# Patient Record
Sex: FEMALE | Race: OTHER | NOT HISPANIC OR LATINO | Employment: STUDENT | URBAN - METROPOLITAN AREA
[De-identification: names, ages, dates, MRNs, and addresses within clinical notes are randomized per-mention and may not be internally consistent; named-entity substitution may affect disease eponyms.]

---

## 2023-08-26 ENCOUNTER — ATHLETIC TRAINING SESSION (OUTPATIENT)
Dept: SPORTS MEDICINE | Facility: CLINIC | Age: 23
End: 2023-08-26

## 2023-08-26 NOTE — PROGRESS NOTES
Subjective:       Chief Complaint: Jann Prakash is a 23 y.o. female student at [employer] who had concerns including Injury and Pain of the Right Femur (R Quad).    During pickup, Jann felt a pull in her R Quad. Her pain scale is 5/10 and walking is slightly uncomfortable.      Handedness: right-handed  Sport played: basketball      Level: college      Position:gaurd      Injury  This is a new problem. The current episode started yesterday. The problem occurs intermittently. The problem has been unchanged. The symptoms are aggravated by walking and bending. She has tried ice for the symptoms. The treatment provided mild relief.   Pain  This is a new problem. The current episode started yesterday. The problem occurs intermittently. The problem has been unchanged. The symptoms are aggravated by bending and walking. She has tried ice for the symptoms. The treatment provided mild relief.       ROS              Objective:       General: Jann is well-developed, well-nourished, appears stated age, in no acute distress, alert and oriented to time, place and person.         General Musculoskeletal Exam   Gait: normal       Right Knee Exam   Right knee exam is normal.    Tenderness   The patient is experiencing no tenderness.     Range of Motion   The patient has normal right knee ROM.    Tests   Ligament Examination   Lachman: normal (-1 to 2mm)   PCL-Posterior Drawer: normal (0 to 2mm)     MCL - Valgus: normal (0 to 2mm)  LCL - Varus: normal  Pivot Shift: normal (Equal)  Reverse Pivot Shift: normal (Equal)  Dial Test at 30 degrees: normal (< 5 degrees)  Dial Test at 90 degrees: normal (< 5 degrees)  Posterior Sag Test: negative  Posterolateral Corner: unstable (>15 degrees difference)    Other   Muscle Tightness: quadriceps tightness    Left Knee Exam   Left knee exam is normal.    Tenderness   The patient is experiencing no tenderness.     Range of Motion   The patient has normal left knee ROM.    Tests   Stability   Lachman:  normal (-1 to 2mm)   PCL-Posterior Drawer: normal (0 to 2mm)  MCL - Valgus: normal (0 to 2mm)  LCL - Varus: normal (0 to 2mm)  Pivot Shift: normal (Equal)  Reverse Pivot Shift: normal (Equal)  Dial Test at 30 degrees: normal (< 5 degrees)  Dial Test at 90 degrees: normal (< 5 degrees)  Posterior Sag Test: negative  Posterolateral Corner: unstable (>15 degrees difference)    Muscle Strength   Right Lower Extremity   Quadriceps:  4/5   Left Lower Extremity   Quadriceps:  5/5             Assessment:     Status: F - Full Participation    Date Out: N/A    Date Cleared: TBD      Plan:   Yasmeenviktoriya completed:    [x]  INJURY TREATMENT   []  MAINTENANCE  DATE OF SERVICE: 8/25/2023  INJURY/CONDITON: R Quad    Jann received the selected modalities after being cleared for contradictions.  Jann received education on potenital side effects of the selected modalities and agreed to treatment.      MODALITIES:    Cryotherapy / Thermotherapy Duration  (Mins) Add. Tx Parameters / Comment   []Cold Tub / Whirlpool (50-60 F)     []Contrast Bath (105-110 F & 50-65 F)     []Game Ready     []Hot Pack     []Hot Tub / Whirlpool ( F)     []Ice Massage     [x]Ice Pack 20mins W/stim   []Paraffin Wax (126-130 F)     []Vapocoolant Spray        Comment:       Electrotherapy Waveform   (AC/DC) Modulation (Cont./Interrupted/Surged) Intensity   (V) Pulse Width/Dur.  (uS) Pulse Rate/Freq.  (Hz, PPS or CPS) Duration  (Mins) Add. Tx Parameters / Comment   []Combo          [x]E-Stim - IFC Dc Juan Manuel. 19   20mins W/ ice   []E-Stim - Premod          []E-Stim - Haitian          []E-Stim - TENS          []E-Stim - Other          []Iontophoresis        Meds:     Comment:      Ultrasound Duty Cycle   (%) Freq.  (Mhz) Intensity   (w/cm2) Duration  (Mins) Add. Tx Parameters / Comment   []Combo        []Phonophoresis     Meds:   []Ultrasound         []Ultrasound and E-Stim          Comment:        Massage Duration  (Mins) Add. Tx Parameters / Comment   []Massage -  IASTM     []Massage - Scar Tissue     []Massage - Self Administered     []Massage - Therapeutic     []Myofascial Release        Comment:      Other Modalities Duration  (Mins)  Add. Tx Parameters / Comment   []Active Release     []Cupping     []Dry Needling     []Intermittent Compression      []Laser     []Lightwave     []Traction      []Other:       Comment:      THERAPEUTIC EXERCISES:    Stretching Cardio Rehab Other   []Stretching - Active []Cardio - Bike []Rehab - Ankle/Foot []Agility []PNF   []Stretching - Dynamic []Cardio - Elliptical []Rehab - Knee []Balance []ROM - Active   []Stretching - Passive []Cardio - Jog/Run []Rehab - Hip []Blood Flow Restriction []ROM - Passive   []Stretching - PNF []Cardio - Treadmill []Rehab - Wrist/Hand []Foam Roller []RTP - Concussion Protocol   []Stretching - Static []Cardio - Upper Body Ergometer []Rehab - Elbow []Functional Exercises []RTP - Sport Specific    []Cardio - Walk []Rehab - Shoulder []Joint Mobilization []Strengthening Exercises     []Rehab - Neck/Spine []Manual Therapy []Other:     []Rehab - Back []Plyometric Exercises      []Rehab - Other       Comment:            Warm-Up Reps/Sets/Time Weight #                         Exercise Reps/Sets/Time Weight #                                                       Comment:      Miscellaneous Add. Tx Parameters / Comment   []Compression Wrap    []Support Wrap    []Taping - Preventative    []Taping - Injured Part    []Wound Care    []Other:      Comment:         []  INJURY TREATMENT   [x]  MAINTENANCE  DATE OF SERVICE: 8/23/2023  INJURY/CONDITON: lower body    Yaly received the selected modalities after being cleared for contradictions.  Yaviktoriya received education on potenital side effects of the selected modalities and agreed to treatment.      MODALITIES:    Cryotherapy / Thermotherapy Duration  (Mins) Add. Tx Parameters / Comment   []Cold Tub / Whirlpool (50-60 F)     []Contrast Bath (105-110 F & 50-65 F)     []Game Ready      []Hot Pack     []Hot Tub / Whirlpool ( F)     []Ice Massage     []Ice Pack     []Paraffin Wax (126-130 F)     []Vapocoolant Spray        Comment:       Electrotherapy Waveform   (AC/DC) Modulation (Cont./Interrupted/Surged) Intensity   (V) Pulse Width/Dur.  (uS) Pulse Rate/Freq.  (Hz, PPS or CPS) Duration  (Mins) Add. Tx Parameters / Comment   []Combo          []E-Stim - IFC          []E-Stim - Premod          []E-Stim - Venezuelan          []E-Stim - TENS          []E-Stim - Other          []Iontophoresis        Meds:     Comment:      Ultrasound Duty Cycle   (%) Freq.  (Mhz) Intensity   (w/cm2) Duration  (Mins) Add. Tx Parameters / Comment   []Combo        []Phonophoresis     Meds:   []Ultrasound         []Ultrasound and E-Stim          Comment:        Massage Duration  (Mins) Add. Tx Parameters / Comment   []Massage - IASTM     []Massage - Scar Tissue     []Massage - Self Administered     []Massage - Therapeutic     []Myofascial Release        Comment:      Other Modalities Duration  (Mins)  Add. Tx Parameters / Comment   []Active Release     []Cupping     []Dry Needling     [x]Intermittent Compression  3omin Recovery boots   []Laser     []Lightwave     []Traction      []Other:       Comment:      THERAPEUTIC EXERCISES:    Stretching Cardio Rehab Other   []Stretching - Active []Cardio - Bike []Rehab - Ankle/Foot []Agility []PNF   []Stretching - Dynamic []Cardio - Elliptical []Rehab - Knee []Balance []ROM - Active   []Stretching - Passive []Cardio - Jog/Run []Rehab - Hip []Blood Flow Restriction []ROM - Passive   []Stretching - PNF []Cardio - Treadmill []Rehab - Wrist/Hand []Foam Roller []RTP - Concussion Protocol   []Stretching - Static []Cardio - Upper Body Ergometer []Rehab - Elbow []Functional Exercises []RTP - Sport Specific    []Cardio - Walk []Rehab - Shoulder []Joint Mobilization []Strengthening Exercises     []Rehab - Neck/Spine []Manual Therapy []Other:     []Rehab - Back []Plyometric Exercises       []Rehab - Other       Comment:            Warm-Up Reps/Sets/Time Weight #                         Exercise Reps/Sets/Time Weight #                                                       Comment:      Miscellaneous Add. Tx Parameters / Comment   []Compression Wrap    []Support Wrap    []Taping - Preventative    []Taping - Injured Part    []Wound Care    []Other:      Comment:         1. Started treatments and continue them as needed.  2. Physician Referral: no  3. ED Referral: no  4. Parent/Guardian Notified: No  5. All questions were answered, ath. will contact me for questions or concerns in  the interim.  6.         Eligible to use School Insurance: Yes

## 2023-08-29 NOTE — PROGRESS NOTES
Subjective:       Chief Complaint: Jann Prakash is a 23 y.o. female student at [employer] who had concerns including Injury and Pain of the Right Femur (R Quad).    Monday she came in and stated that the muscle is still tight. She had to be modified in weights with lungs because coming out of the lung was to painful.    During pickup, Jann felt a pull in her R Quad. Her pain scale is 5/10 and walking is slightly uncomfortable.         Handedness: right-handed  Sport played: basketball      Level: college      Position:gaurd      Injury  This is a recurrent problem. The current episode started in the past 7 days. The problem occurs every several days. The problem has been unchanged. The symptoms are aggravated by bending. She has tried ice for the symptoms. The treatment provided mild relief.   Pain  This is a recurrent problem. The current episode started in the past 7 days. The problem occurs daily. The problem has been unchanged. Nothing aggravates the symptoms. She has tried ice for the symptoms.       ROS              Objective:       General: Jann is well-developed, well-nourished, appears stated age, in no acute distress, alert and oriented to time, place and person.     AT Session          Assessment:     Status: AT - Cleared to Exert    Date Out: N/A    Date Cleared: TBD      Plan:   Jann completed:    [x]  INJURY TREATMENT   []  MAINTENANCE  DATE OF SERVICE: 8/30/2023  INJURY/CONDITON: R Quad    Jann received the selected modalities after being cleared for contradictions.  Jann received education on potenital side effects of the selected modalities and agreed to treatment.      MODALITIES:    Cryotherapy / Thermotherapy Duration  (Mins) Add. Tx Parameters / Comment   []Cold Tub / Whirlpool (50-60 F)     []Contrast Bath (105-110 F & 50-65 F)     []Game Ready     []Hot Pack     []Hot Tub / Whirlpool ( F)     []Ice Massage     []Ice Pack     []Paraffin Wax (126-130 F)     []Vapocoolant Spray         Comment:       Electrotherapy Waveform   (AC/DC) Modulation (Cont./Interrupted/Surged) Intensity   (V) Pulse Width/Dur.  (uS) Pulse Rate/Freq.  (Hz, PPS or CPS) Duration  (Mins) Add. Tx Parameters / Comment   []Combo          []E-Stim - IFC          []E-Stim - Premod          []E-Stim - Barbadian          []E-Stim - TENS          []E-Stim - Other          []Iontophoresis        Meds:     Comment:      Ultrasound Duty Cycle   (%) Freq.  (Mhz) Intensity   (w/cm2) Duration  (Mins) Add. Tx Parameters / Comment   []Combo        []Phonophoresis     Meds:   []Ultrasound         []Ultrasound and E-Stim          Comment:        Massage Duration  (Mins) Add. Tx Parameters / Comment   [x]Massage - IASTM 7mins    []Massage - Scar Tissue     []Massage - Self Administered     []Massage - Therapeutic     []Myofascial Release        Comment:      Other Modalities Duration  (Mins)  Add. Tx Parameters / Comment   []Active Release     []Cupping     []Dry Needling     []Intermittent Compression      []Laser     []Lightwave     []Traction      []Other:       Comment:      THERAPEUTIC EXERCISES:    Stretching Cardio Rehab Other   []Stretching - Active []Cardio - Bike []Rehab - Ankle/Foot []Agility []PNF   []Stretching - Dynamic []Cardio - Elliptical []Rehab - Knee []Balance []ROM - Active   []Stretching - Passive []Cardio - Jog/Run []Rehab - Hip []Blood Flow Restriction []ROM - Passive   []Stretching - PNF []Cardio - Treadmill []Rehab - Wrist/Hand []Foam Roller []RTP - Concussion Protocol   []Stretching - Static []Cardio - Upper Body Ergometer []Rehab - Elbow []Functional Exercises []RTP - Sport Specific    []Cardio - Walk []Rehab - Shoulder []Joint Mobilization []Strengthening Exercises     []Rehab - Neck/Spine []Manual Therapy []Other:     []Rehab - Back []Plyometric Exercises      []Rehab - Other       Comment:            Warm-Up Reps/Sets/Time Weight #                         Exercise Reps/Sets/Time Weight #                                                        Comment:      Miscellaneous Add. Tx Parameters / Comment   []Compression Wrap    []Support Wrap    []Taping - Preventative    []Taping - Injured Part    []Wound Care    []Other:      Comment:         [x]  INJURY TREATMENT   []  MAINTENANCE  DATE OF SERVICE: 8/28/2023  INJURY/CONDITON: MARGARITA Forte received the selected modalities after being cleared for contradictions.  Jann received education on potenital side effects of the selected modalities and agreed to treatment.      MODALITIES:    Cryotherapy / Thermotherapy Duration  (Mins) Add. Tx Parameters / Comment   []Cold Tub / Whirlpool (50-60 F)     []Contrast Bath (105-110 F & 50-65 F)     []Game Ready     []Hot Pack     []Hot Tub / Whirlpool ( F)     [x]Ice Massage 10mins    []Ice Pack     []Paraffin Wax (126-130 F)     []Vapocoolant Spray        Comment:       Electrotherapy Waveform   (AC/DC) Modulation (Cont./Interrupted/Surged) Intensity   (V) Pulse Width/Dur.  (uS) Pulse Rate/Freq.  (Hz, PPS or CPS) Duration  (Mins) Add. Tx Parameters / Comment   []Combo          []E-Stim - IFC          []E-Stim - Premod          []E-Stim - Omani          []E-Stim - TENS          []E-Stim - Other          []Iontophoresis        Meds:     Comment:      Ultrasound Duty Cycle   (%) Freq.  (Mhz) Intensity   (w/cm2) Duration  (Mins) Add. Tx Parameters / Comment   []Combo        []Phonophoresis     Meds:   []Ultrasound         []Ultrasound and E-Stim          Comment:        Massage Duration  (Mins) Add. Tx Parameters / Comment   []Massage - IASTM     []Massage - Scar Tissue     []Massage - Self Administered     []Massage - Therapeutic     []Myofascial Release        Comment:      Other Modalities Duration  (Mins)  Add. Tx Parameters / Comment   []Active Release     []Cupping     []Dry Needling     []Intermittent Compression      []Laser     []Lightwave     []Traction      []Other:       Comment:      THERAPEUTIC EXERCISES:    Stretching  Cardio Rehab Other   []Stretching - Active []Cardio - Bike []Rehab - Ankle/Foot []Agility []PNF   []Stretching - Dynamic []Cardio - Elliptical []Rehab - Knee []Balance []ROM - Active   []Stretching - Passive []Cardio - Jog/Run []Rehab - Hip []Blood Flow Restriction []ROM - Passive   []Stretching - PNF []Cardio - Treadmill []Rehab - Wrist/Hand []Foam Roller []RTP - Concussion Protocol   []Stretching - Static []Cardio - Upper Body Ergometer []Rehab - Elbow []Functional Exercises []RTP - Sport Specific    []Cardio - Walk []Rehab - Shoulder []Joint Mobilization []Strengthening Exercises     []Rehab - Neck/Spine []Manual Therapy []Other:     []Rehab - Back []Plyometric Exercises      []Rehab - Other       Comment:            Warm-Up Reps/Sets/Time Weight #                         Exercise Reps/Sets/Time Weight #                                                       Comment:      Miscellaneous Add. Tx Parameters / Comment   []Compression Wrap    []Support Wrap    []Taping - Preventative    []Taping - Injured Part    []Wound Care    []Other:      Comment:         1. Keep trying to losen the quad.  2. Physician Referral: no  3. ED Referral: no  4. Parent/Guardian Notified: No  5. All questions were answered, ath. will contact me for questions or concerns in  the interim.  6.         Eligible to use School Insurance: Yes

## 2023-09-09 NOTE — PROGRESS NOTES
Subjective:       Chief Complaint: Jann Prakash is a 23 y.o. female student at [employer] who had concerns including Injury and Pain of the Right Femur (R Quad).    Complain 9/8 that it was still feeling some tightness.    Monday she came in and stated that the muscle is still tight. She had to be modified in weights with lungs because coming out of the lung was to painful.     During pickup, Jann felt a pull in her R Quad. Her pain scale is 5/10 and walking is slightly uncomfortable.         Handedness: right-handed  Sport played: basketball      Level: college      Position:gaurd      Injury  This is a recurrent problem. The current episode started 1 to 4 weeks ago. The problem occurs every several days. The problem has been gradually improving. The symptoms are aggravated by bending. The treatment provided mild relief.   Pain  This is a recurrent problem. The current episode started 1 to 4 weeks ago. The problem occurs every several days. The problem has been gradually improving. The symptoms are aggravated by bending. The treatment provided mild relief.       ROS              Objective:       General: Jann is well-developed, well-nourished, appears stated age, in no acute distress, alert and oriented to time, place and person.               Right Knee Exam     Other   Muscle Tightness: quadriceps tightness    Left Knee Exam   Left knee exam is normal.            Assessment:     Status: F - Full Participation    Date Out: N/a    Date Cleared: N/a      Plan:   Jann completed:    [x]  INJURY TREATMENT   []  MAINTENANCE  DATE OF SERVICE: 9/8/2023  INJURY/CONDITON: R Quad    Jann received the selected modalities after being cleared for contradictions.  Jann received education on potenital side effects of the selected modalities and agreed to treatment.      MODALITIES:    Cryotherapy / Thermotherapy Duration  (Mins) Add. Tx Parameters / Comment   []Cold Tub / Whirlpool (50-60 F)     []Contrast Bath (105-110 F & 50-65 F)      []Game Ready     []Hot Pack     []Hot Tub / Whirlpool ( F)     []Ice Massage     []Ice Pack     []Paraffin Wax (126-130 F)     []Vapocoolant Spray        Comment:       Electrotherapy Waveform   (AC/DC) Modulation (Cont./Interrupted/Surged) Intensity   (V) Pulse Width/Dur.  (uS) Pulse Rate/Freq.  (Hz, PPS or CPS) Duration  (Mins) Add. Tx Parameters / Comment   []Combo          []E-Stim - IFC          []E-Stim - Premod          []E-Stim - Nauruan          []E-Stim - TENS          []E-Stim - Other          []Iontophoresis        Meds:     Comment:      Ultrasound Duty Cycle   (%) Freq.  (Mhz) Intensity   (w/cm2) Duration  (Mins) Add. Tx Parameters / Comment   []Combo        []Phonophoresis     Meds:   []Ultrasound         []Ultrasound and E-Stim          Comment:        Massage Duration  (Mins) Add. Tx Parameters / Comment   []Massage - IASTM     []Massage - Scar Tissue     []Massage - Self Administered     []Massage - Therapeutic     []Myofascial Release        Comment:      Other Modalities Duration  (Mins)  Add. Tx Parameters / Comment   []Active Release     [x]Cupping 8mins Active movement for 2mins   []Dry Needling     []Intermittent Compression      []Laser     []Lightwave     []Traction      []Other:       Comment:      THERAPEUTIC EXERCISES:    Stretching Cardio Rehab Other   []Stretching - Active []Cardio - Bike []Rehab - Ankle/Foot []Agility []PNF   []Stretching - Dynamic []Cardio - Elliptical []Rehab - Knee []Balance []ROM - Active   []Stretching - Passive []Cardio - Jog/Run []Rehab - Hip []Blood Flow Restriction []ROM - Passive   []Stretching - PNF []Cardio - Treadmill []Rehab - Wrist/Hand []Foam Roller []RTP - Concussion Protocol   []Stretching - Static []Cardio - Upper Body Ergometer []Rehab - Elbow []Functional Exercises []RTP - Sport Specific    []Cardio - Walk []Rehab - Shoulder []Joint Mobilization []Strengthening Exercises     []Rehab - Neck/Spine []Manual Therapy []Other:     []Rehab -  Back []Plyometric Exercises      []Rehab - Other       Comment:            Warm-Up Reps/Sets/Time Weight #                         Exercise Reps/Sets/Time Weight #                                                       Comment:      Miscellaneous Add. Tx Parameters / Comment   []Compression Wrap    []Support Wrap    []Taping - Preventative    []Taping - Injured Part    []Wound Care    []Other:      Comment:         1. Treatment as needed  2. Physician Referral: no  3. ED Referral: no  4. Parent/Guardian Notified: No  5. All questions were answered, ath. will contact me for questions or concerns in  the interim.  6.         Eligible to use School Insurance: Yes

## 2023-09-26 ENCOUNTER — OFFICE VISIT (OUTPATIENT)
Dept: URGENT CARE | Facility: CLINIC | Age: 23
End: 2023-09-26
Payer: COMMERCIAL

## 2023-09-26 VITALS
HEIGHT: 68 IN | OXYGEN SATURATION: 97 % | RESPIRATION RATE: 18 BRPM | BODY MASS INDEX: 24 KG/M2 | SYSTOLIC BLOOD PRESSURE: 115 MMHG | TEMPERATURE: 98 F | DIASTOLIC BLOOD PRESSURE: 71 MMHG | HEART RATE: 86 BPM | WEIGHT: 158.38 LBS

## 2023-09-26 DIAGNOSIS — H66.001 NON-RECURRENT ACUTE SUPPURATIVE OTITIS MEDIA OF RIGHT EAR WITHOUT SPONTANEOUS RUPTURE OF TYMPANIC MEMBRANE: ICD-10-CM

## 2023-09-26 DIAGNOSIS — J02.9 SORE THROAT: Primary | ICD-10-CM

## 2023-09-26 DIAGNOSIS — J32.9 SINUSITIS, UNSPECIFIED CHRONICITY, UNSPECIFIED LOCATION: ICD-10-CM

## 2023-09-26 LAB
CTP QC/QA: YES
CTP QC/QA: YES
MOLECULAR STREP A: NEGATIVE
SARS-COV-2 AG RESP QL IA.RAPID: NEGATIVE

## 2023-09-26 PROCEDURE — 87811 SARS-COV-2 COVID19 W/OPTIC: CPT | Mod: QW,S$GLB,, | Performed by: INTERNAL MEDICINE

## 2023-09-26 PROCEDURE — 99499 NO LOS: ICD-10-PCS | Mod: S$GLB,,, | Performed by: INTERNAL MEDICINE

## 2023-09-26 PROCEDURE — 87811 SARS CORONAVIRUS 2 ANTIGEN POCT, MANUAL READ: ICD-10-PCS | Mod: QW,S$GLB,, | Performed by: INTERNAL MEDICINE

## 2023-09-26 PROCEDURE — 99499 UNLISTED E&M SERVICE: CPT | Mod: S$GLB,,, | Performed by: INTERNAL MEDICINE

## 2023-09-26 PROCEDURE — 87651 POCT STREP A MOLECULAR: ICD-10-PCS | Mod: QW,S$GLB,, | Performed by: INTERNAL MEDICINE

## 2023-09-26 PROCEDURE — 87651 STREP A DNA AMP PROBE: CPT | Mod: QW,S$GLB,, | Performed by: INTERNAL MEDICINE

## 2023-09-26 RX ORDER — PREDNISONE 20 MG/1
20 TABLET ORAL DAILY
Qty: 3 TABLET | Refills: 0 | Status: SHIPPED | OUTPATIENT
Start: 2023-09-26 | End: 2023-09-29

## 2023-09-26 RX ORDER — AMOXICILLIN 500 MG/1
500 TABLET, FILM COATED ORAL EVERY 12 HOURS
Qty: 14 TABLET | Refills: 0 | Status: SHIPPED | OUTPATIENT
Start: 2023-09-26 | End: 2023-10-03

## 2023-09-26 NOTE — PROGRESS NOTES
"Subjective:      Patient ID: Jann Prakash is a 23 y.o. female.    Vitals:  height is 5' 8" (1.727 m) and weight is 71.8 kg (158 lb 6.4 oz). Her temperature is 98.3 °F (36.8 °C). Her blood pressure is 115/71 and her pulse is 86. Her respiration is 18 and oxygen saturation is 97%.     Chief Complaint: Sinus Problem    Student of [employer].    Sinus Problem  This is a new problem. The current episode started today. The problem has been gradually worsening since onset. There has been no fever. Her pain is at a severity of 7/10. The pain is moderate. Associated symptoms include chills, congestion, ear pain, headaches, a hoarse voice, neck pain, sinus pressure, a sore throat and swollen glands. Pertinent negatives include no coughing, diaphoresis, shortness of breath or sneezing. Past treatments include nothing. The treatment provided no relief.       Constitution: Positive for chills. Negative for sweating.   HENT:  Positive for ear pain, congestion, sinus pressure and sore throat.    Neck: Positive for neck pain.   Respiratory:  Negative for cough and shortness of breath.    Skin:  Negative for erythema.   Allergic/Immunologic: Negative for sneezing.   Neurological:  Positive for headaches.      Objective:     Physical Exam   Constitutional: She is oriented to person, place, and time. She appears well-developed. No distress.   HENT:   Head: Normocephalic and atraumatic.   Ears:   Right Ear: External ear normal.   Left Ear: Tympanic membrane, external ear and ear canal normal.      Comments: Right TM bulginging and red.   Nose: Congestion present.   Mouth/Throat: Oropharynx is clear and moist. Mucous membranes are moist. No oropharyngeal exudate. Oropharynx is clear.   Eyes: Conjunctivae and EOM are normal. Pupils are equal, round, and reactive to light. Right eye exhibits no discharge. Left eye exhibits no discharge. No scleral icterus.   Neck: Neck supple.   Cardiovascular: Normal rate, regular rhythm and normal heart " sounds.   No murmur heard.Exam reveals no gallop and no friction rub.   Pulmonary/Chest: Effort normal. No respiratory distress. She has no wheezes. She has no rales.   Abdominal: There is no abdominal tenderness.   Lymphadenopathy:     She has no cervical adenopathy.   Neurological: She is alert and oriented to person, place, and time.   Skin: Skin is warm, dry, not diaphoretic and no rash. Capillary refill takes less than 2 seconds. No erythema   Psychiatric: Her behavior is normal.   Nursing note and vitals reviewed.      Assessment:     1. Sore throat    2. Non-recurrent acute suppurative otitis media of right ear without spontaneous rupture of tympanic membrane        Plan:       Sore throat  -     SARS Coronavirus 2 Antigen, POCT Manual Read  -     POCT Strep A, Molecular    Non-recurrent acute suppurative otitis media of right ear without spontaneous rupture of tympanic membrane  -     amoxicillin (AMOXIL) 500 MG Tab; Take 1 tablet (500 mg total) by mouth every 12 (twelve) hours. for 7 days  Dispense: 14 tablet; Refill: 0  -     predniSONE (DELTASONE) 20 MG tablet; Take 1 tablet (20 mg total) by mouth once daily. for 3 days  Dispense: 3 tablet; Refill: 0

## 2023-09-30 ENCOUNTER — ATHLETIC TRAINING SESSION (OUTPATIENT)
Dept: SPORTS MEDICINE | Facility: CLINIC | Age: 23
End: 2023-09-30
Payer: COMMERCIAL

## 2023-09-30 NOTE — PROGRESS NOTES
Subjective:       Chief Complaint: Jann Prakash is a 23 y.o. female student at [employer] who had no chief complaint listed for this encounter.    On 9/25/2023, Jann reports to me complaining of pain and not feeling good. She goes to the  and his negative for strep throat and Covid. She was diagnose with and ear infection. I gave her some meds for motion sickness and antibiotics which has been helping with the ear infection. But on Friday I get an text message at  3:51am. Stating that she was not feeling good and she is feeling worse then before. I immediately told her to stay home from practice.    Handedness: right-handed  Sport played: basketball      Level: college      Position:geri MARTÍNEZ              Objective:       General: Jann is well-developed, well-nourished, appears stated age, in no acute distress, alert and oriented to time, place and person.     AT Session          Assessment:     Status: F - Full Participation    Date Out: 9/29/2023    Date Cleared: 8/1/2023      Plan:   Jann completed:      []  INJURY TREATMENT   [x]  MAINTENANCE  DATE OF SERVICE: 9/28/2023  INJURY/CONDITON: Both ankles, Both qauds    Jann received the selected modalities after being cleared for contradictions.  Jann received education on potenital side effects of the selected modalities and agreed to treatment.      MODALITIES:    Cryotherapy / Thermotherapy Duration  (Mins) Add. Tx Parameters / Comment   []Cold Tub / Whirlpool (50-60 F)     []Contrast Bath (105-110 F & 50-65 F)     []Game Ready     []Hot Pack     []Hot Tub / Whirlpool ( F)     []Ice Massage     []Ice Pack     []Paraffin Wax (126-130 F)     []Vapocoolant Spray        Comment:       Electrotherapy Waveform   (AC/DC) Modulation (Cont./Interrupted/Surged) Intensity   (V) Pulse Width/Dur.  (uS) Pulse Rate/Freq.  (Hz, PPS or CPS) Duration  (Mins) Add. Tx Parameters / Comment   []Combo          []E-Stim - IFC          []E-Stim - Premod          []E-Stim -  Greenlandic          []E-Stim - TENS          []E-Stim - Other          []Iontophoresis        Meds:     Comment:      Ultrasound Duty Cycle   (%) Freq.  (Mhz) Intensity   (w/cm2) Duration  (Mins) Add. Tx Parameters / Comment   []Combo        []Phonophoresis     Meds:   []Ultrasound         []Ultrasound and E-Stim          Comment:        Massage Duration  (Mins) Add. Tx Parameters / Comment   []Massage - IASTM     []Massage - Scar Tissue     []Massage - Self Administered     []Massage - Therapeutic     []Myofascial Release        Comment:      Other Modalities Duration  (Mins)  Add. Tx Parameters / Comment   []Active Release     [x]Cupping 7mins    []Dry Needling     []Intermittent Compression      []Laser     []Lightwave     []Traction      []Other:       Comment:      THERAPEUTIC EXERCISES:    Stretching Cardio Rehab Other   []Stretching - Active []Cardio - Bike []Rehab - Ankle/Foot []Agility []PNF   []Stretching - Dynamic []Cardio - Elliptical []Rehab - Knee []Balance []ROM - Active   []Stretching - Passive []Cardio - Jog/Run []Rehab - Hip []Blood Flow Restriction []ROM - Passive   []Stretching - PNF []Cardio - Treadmill []Rehab - Wrist/Hand []Foam Roller []RTP - Concussion Protocol   []Stretching - Static []Cardio - Upper Body Ergometer []Rehab - Elbow []Functional Exercises []RTP - Sport Specific    []Cardio - Walk []Rehab - Shoulder []Joint Mobilization []Strengthening Exercises     []Rehab - Neck/Spine []Manual Therapy []Other:     []Rehab - Back []Plyometric Exercises      []Rehab - Other       Comment:            Warm-Up Reps/Sets/Time Weight #                         Exercise Reps/Sets/Time Weight #                                                       Comment:      Miscellaneous Add. Tx Parameters / Comment   []Compression Wrap    []Support Wrap    [x]Taping - Preventative ankles   []Taping - Injured Part    []Wound Care    []Other:      Comment:         []  INJURY TREATMENT   [x]  MAINTENANCE  DATE OF  SERVICE: 9/272/2023  INJURY/CONDITON: Both ankles    Jann received the selected modalities after being cleared for contradictions.  Jann received education on potenital side effects of the selected modalities and agreed to treatment.      MODALITIES:    Cryotherapy / Thermotherapy Duration  (Mins) Add. Tx Parameters / Comment   []Cold Tub / Whirlpool (50-60 F)     []Contrast Bath (105-110 F & 50-65 F)     []Game Ready     []Hot Pack     []Hot Tub / Whirlpool ( F)     []Ice Massage     []Ice Pack     []Paraffin Wax (126-130 F)     []Vapocoolant Spray        Comment:       Electrotherapy Waveform   (AC/DC) Modulation (Cont./Interrupted/Surged) Intensity   (V) Pulse Width/Dur.  (uS) Pulse Rate/Freq.  (Hz, PPS or CPS) Duration  (Mins) Add. Tx Parameters / Comment   []Combo          []E-Stim - IFC          []E-Stim - Premod          []E-Stim - Gambian          []E-Stim - TENS          []E-Stim - Other          []Iontophoresis        Meds:     Comment:      Ultrasound Duty Cycle   (%) Freq.  (Mhz) Intensity   (w/cm2) Duration  (Mins) Add. Tx Parameters / Comment   []Combo        []Phonophoresis     Meds:   []Ultrasound         []Ultrasound and E-Stim          Comment:        Massage Duration  (Mins) Add. Tx Parameters / Comment   []Massage - IASTM     []Massage - Scar Tissue     []Massage - Self Administered     []Massage - Therapeutic     []Myofascial Release        Comment:      Other Modalities Duration  (Mins)  Add. Tx Parameters / Comment   []Active Release     []Cupping     []Dry Needling     []Intermittent Compression      []Laser     []Lightwave     []Traction      []Other:       Comment:      THERAPEUTIC EXERCISES:    Stretching Cardio Rehab Other   []Stretching - Active []Cardio - Bike []Rehab - Ankle/Foot []Agility []PNF   []Stretching - Dynamic []Cardio - Elliptical []Rehab - Knee []Balance []ROM - Active   []Stretching - Passive []Cardio - Jog/Run []Rehab - Hip []Blood Flow Restriction []ROM - Passive    []Stretching - PNF []Cardio - Treadmill []Rehab - Wrist/Hand []Foam Roller []RTP - Concussion Protocol   []Stretching - Static []Cardio - Upper Body Ergometer []Rehab - Elbow []Functional Exercises []RTP - Sport Specific    []Cardio - Walk []Rehab - Shoulder []Joint Mobilization []Strengthening Exercises     []Rehab - Neck/Spine []Manual Therapy []Other:     []Rehab - Back []Plyometric Exercises      []Rehab - Other       Comment:            Warm-Up Reps/Sets/Time Weight #                         Exercise Reps/Sets/Time Weight #                                                       Comment:      Miscellaneous Add. Tx Parameters / Comment   []Compression Wrap    []Support Wrap    [x]Taping - Preventative ankles   []Taping - Injured Part    []Wound Care    []Other:      Comment:         []  INJURY TREATMENT   [x]  MAINTENANCE  DATE OF SERVICE: 9/26/2023  INJURY/CONDITON: Both ankles    Jann received the selected modalities after being cleared for contradictions.  Jann received education on potenital side effects of the selected modalities and agreed to treatment.      MODALITIES:    Cryotherapy / Thermotherapy Duration  (Mins) Add. Tx Parameters / Comment   []Cold Tub / Whirlpool (50-60 F)     []Contrast Bath (105-110 F & 50-65 F)     []Game Ready     []Hot Pack     []Hot Tub / Whirlpool ( F)     []Ice Massage     []Ice Pack     []Paraffin Wax (126-130 F)     []Vapocoolant Spray        Comment:       Electrotherapy Waveform   (AC/DC) Modulation (Cont./Interrupted/Surged) Intensity   (V) Pulse Width/Dur.  (uS) Pulse Rate/Freq.  (Hz, PPS or CPS) Duration  (Mins) Add. Tx Parameters / Comment   []Combo          []E-Stim - IFC          []E-Stim - Premod          []E-Stim - Macanese          []E-Stim - TENS          []E-Stim - Other          []Iontophoresis        Meds:     Comment:      Ultrasound Duty Cycle   (%) Freq.  (Mhz) Intensity   (w/cm2) Duration  (Mins) Add. Tx Parameters / Comment   []Combo         []Phonophoresis     Meds:   []Ultrasound         []Ultrasound and E-Stim          Comment:        Massage Duration  (Mins) Add. Tx Parameters / Comment   []Massage - IASTM     []Massage - Scar Tissue     []Massage - Self Administered     []Massage - Therapeutic     []Myofascial Release        Comment:      Other Modalities Duration  (Mins)  Add. Tx Parameters / Comment   []Active Release     []Cupping     []Dry Needling     []Intermittent Compression      []Laser     []Lightwave     []Traction      []Other:       Comment:      THERAPEUTIC EXERCISES:    Stretching Cardio Rehab Other   []Stretching - Active []Cardio - Bike []Rehab - Ankle/Foot []Agility []PNF   []Stretching - Dynamic []Cardio - Elliptical []Rehab - Knee []Balance []ROM - Active   []Stretching - Passive []Cardio - Jog/Run []Rehab - Hip []Blood Flow Restriction []ROM - Passive   []Stretching - PNF []Cardio - Treadmill []Rehab - Wrist/Hand []Foam Roller []RTP - Concussion Protocol   []Stretching - Static []Cardio - Upper Body Ergometer []Rehab - Elbow []Functional Exercises []RTP - Sport Specific    []Cardio - Walk []Rehab - Shoulder []Joint Mobilization []Strengthening Exercises     []Rehab - Neck/Spine []Manual Therapy []Other:     []Rehab - Back []Plyometric Exercises      []Rehab - Other       Comment:            Warm-Up Reps/Sets/Time Weight #                         Exercise Reps/Sets/Time Weight #                                                       Comment:      Miscellaneous Add. Tx Parameters / Comment   []Compression Wrap    []Support Wrap    [x]Taping - Preventative ankles   []Taping - Injured Part    []Wound Care    []Other:      Comment:         []  INJURY TREATMENT   [x]  MAINTENANCE  DATE OF SERVICE: 9/25/2023  INJURY/CONDITON:Both ankles    Jann received the selected modalities after being cleared for contradictions.  Jann received education on potenital side effects of the selected modalities and agreed to  treatment.      MODALITIES:    Cryotherapy / Thermotherapy Duration  (Mins) Add. Tx Parameters / Comment   []Cold Tub / Whirlpool (50-60 F)     []Contrast Bath (105-110 F & 50-65 F)     []Game Ready     []Hot Pack     []Hot Tub / Whirlpool ( F)     []Ice Massage     []Ice Pack     []Paraffin Wax (126-130 F)     []Vapocoolant Spray        Comment:       Electrotherapy Waveform   (AC/DC) Modulation (Cont./Interrupted/Surged) Intensity   (V) Pulse Width/Dur.  (uS) Pulse Rate/Freq.  (Hz, PPS or CPS) Duration  (Mins) Add. Tx Parameters / Comment   []Combo          []E-Stim - IFC          []E-Stim - Premod          []E-Stim - Omani          []E-Stim - TENS          []E-Stim - Other          []Iontophoresis        Meds:     Comment:      Ultrasound Duty Cycle   (%) Freq.  (Mhz) Intensity   (w/cm2) Duration  (Mins) Add. Tx Parameters / Comment   []Combo        []Phonophoresis     Meds:   []Ultrasound         []Ultrasound and E-Stim          Comment:        Massage Duration  (Mins) Add. Tx Parameters / Comment   []Massage - IASTM     []Massage - Scar Tissue     []Massage - Self Administered     []Massage - Therapeutic     []Myofascial Release        Comment:      Other Modalities Duration  (Mins)  Add. Tx Parameters / Comment   []Active Release     []Cupping     []Dry Needling     []Intermittent Compression      []Laser     []Lightwave     []Traction      []Other:       Comment:      THERAPEUTIC EXERCISES:    Stretching Cardio Rehab Other   []Stretching - Active []Cardio - Bike []Rehab - Ankle/Foot []Agility []PNF   []Stretching - Dynamic []Cardio - Elliptical []Rehab - Knee []Balance []ROM - Active   []Stretching - Passive []Cardio - Jog/Run []Rehab - Hip []Blood Flow Restriction []ROM - Passive   []Stretching - PNF []Cardio - Treadmill []Rehab - Wrist/Hand []Foam Roller []RTP - Concussion Protocol   []Stretching - Static []Cardio - Upper Body Ergometer []Rehab - Elbow []Functional Exercises []RTP - Sport Specific     []Cardio - Walk []Rehab - Shoulder []Joint Mobilization []Strengthening Exercises     []Rehab - Neck/Spine []Manual Therapy []Other:     []Rehab - Back []Plyometric Exercises      []Rehab - Other       Comment:            Warm-Up Reps/Sets/Time Weight #                         Exercise Reps/Sets/Time Weight #                                                       Comment:      Miscellaneous Add. Tx Parameters / Comment   []Compression Wrap    []Support Wrap    [x]Taping - Preventative ankles   []Taping - Injured Part    []Wound Care    []Other:      Comment:         1. Rest and she her back on Sunday  2. Physician Referral: no  3. ED Referral: no  4. Parent/Guardian Notified: No  5. All questions were answered, ath. will contact me for questions or concerns in  the interim.  6.         Eligible to use School Insurance: No, not a school related injury

## 2023-10-03 ENCOUNTER — OFFICE VISIT (OUTPATIENT)
Dept: URGENT CARE | Facility: CLINIC | Age: 23
End: 2023-10-03
Payer: COMMERCIAL

## 2023-10-03 VITALS
OXYGEN SATURATION: 100 % | WEIGHT: 158 LBS | BODY MASS INDEX: 23.95 KG/M2 | HEART RATE: 80 BPM | HEIGHT: 68 IN | SYSTOLIC BLOOD PRESSURE: 126 MMHG | TEMPERATURE: 98 F | RESPIRATION RATE: 18 BRPM | DIASTOLIC BLOOD PRESSURE: 75 MMHG

## 2023-10-03 DIAGNOSIS — R50.9 FEVER, UNSPECIFIED FEVER CAUSE: ICD-10-CM

## 2023-10-03 DIAGNOSIS — J02.9 SORE THROAT: ICD-10-CM

## 2023-10-03 DIAGNOSIS — R53.83 FATIGUE, UNSPECIFIED TYPE: ICD-10-CM

## 2023-10-03 DIAGNOSIS — R05.9 COUGH, UNSPECIFIED TYPE: Primary | ICD-10-CM

## 2023-10-03 LAB
ALBUMIN SERPL BCP-MCNC: 4.2 G/DL (ref 3.5–5.2)
ALP SERPL-CCNC: 61 U/L (ref 55–135)
ALT SERPL W/O P-5'-P-CCNC: 14 U/L (ref 10–44)
ANION GAP SERPL CALC-SCNC: 10 MMOL/L (ref 8–16)
AST SERPL-CCNC: 16 U/L (ref 10–40)
BASOPHILS # BLD AUTO: 0.06 K/UL (ref 0–0.2)
BASOPHILS NFR BLD: 0.8 % (ref 0–1.9)
BILIRUB SERPL-MCNC: 0.3 MG/DL (ref 0.1–1)
BUN SERPL-MCNC: 13 MG/DL (ref 6–20)
CALCIUM SERPL-MCNC: 9.9 MG/DL (ref 8.7–10.5)
CHLORIDE SERPL-SCNC: 101 MMOL/L (ref 95–110)
CO2 SERPL-SCNC: 27 MMOL/L (ref 23–29)
CREAT SERPL-MCNC: 0.8 MG/DL (ref 0.5–1.4)
CTP QC/QA: YES
CTP QC/QA: YES
DIFFERENTIAL METHOD: ABNORMAL
EOSINOPHIL # BLD AUTO: 0.1 K/UL (ref 0–0.5)
EOSINOPHIL NFR BLD: 1.6 % (ref 0–8)
ERYTHROCYTE [DISTWIDTH] IN BLOOD BY AUTOMATED COUNT: 12.8 % (ref 11.5–14.5)
EST. GFR  (NO RACE VARIABLE): >60 ML/MIN/1.73 M^2
GLUCOSE SERPL-MCNC: 116 MG/DL (ref 70–110)
HCT VFR BLD AUTO: 39.6 % (ref 37–48.5)
HETEROPH AB SERPL QL IA: NEGATIVE
HGB BLD-MCNC: 13.7 G/DL (ref 12–16)
HIV 1+2 AB+HIV1 P24 AG SERPL QL IA: NORMAL
IMM GRANULOCYTES # BLD AUTO: 0.1 K/UL (ref 0–0.04)
IMM GRANULOCYTES NFR BLD AUTO: 1.3 % (ref 0–0.5)
LYMPHOCYTES # BLD AUTO: 2.2 K/UL (ref 1–4.8)
LYMPHOCYTES NFR BLD: 27.8 % (ref 18–48)
MCH RBC QN AUTO: 29.4 PG (ref 27–31)
MCHC RBC AUTO-ENTMCNC: 34.6 G/DL (ref 32–36)
MCV RBC AUTO: 85 FL (ref 82–98)
MOLECULAR STREP A: NEGATIVE
MONOCYTES # BLD AUTO: 0.5 K/UL (ref 0.3–1)
MONOCYTES NFR BLD: 6.1 % (ref 4–15)
NEUTROPHILS # BLD AUTO: 4.9 K/UL (ref 1.8–7.7)
NEUTROPHILS NFR BLD: 62.4 % (ref 38–73)
NRBC BLD-RTO: 0 /100 WBC
PLATELET # BLD AUTO: 375 K/UL (ref 150–450)
PMV BLD AUTO: 9.3 FL (ref 9.2–12.9)
POTASSIUM SERPL-SCNC: 4 MMOL/L (ref 3.5–5.1)
PROT SERPL-MCNC: 8.2 G/DL (ref 6–8.4)
RBC # BLD AUTO: 4.66 M/UL (ref 4–5.4)
SARS-COV-2 AG RESP QL IA.RAPID: NEGATIVE
SODIUM SERPL-SCNC: 138 MMOL/L (ref 136–145)
WBC # BLD AUTO: 7.9 K/UL (ref 3.9–12.7)

## 2023-10-03 PROCEDURE — 87811 SARS-COV-2 COVID19 W/OPTIC: CPT | Mod: QW,S$GLB,, | Performed by: INTERNAL MEDICINE

## 2023-10-03 PROCEDURE — 87651 POCT STREP A MOLECULAR: ICD-10-PCS | Mod: QW,S$GLB,, | Performed by: INTERNAL MEDICINE

## 2023-10-03 PROCEDURE — 87651 STREP A DNA AMP PROBE: CPT | Mod: QW,S$GLB,, | Performed by: INTERNAL MEDICINE

## 2023-10-03 PROCEDURE — 85025 COMPLETE CBC W/AUTO DIFF WBC: CPT | Performed by: INTERNAL MEDICINE

## 2023-10-03 PROCEDURE — 86665 EPSTEIN-BARR CAPSID VCA: CPT | Mod: 59 | Performed by: INTERNAL MEDICINE

## 2023-10-03 PROCEDURE — 87389 HIV-1 AG W/HIV-1&-2 AB AG IA: CPT | Performed by: INTERNAL MEDICINE

## 2023-10-03 PROCEDURE — 99499 UNLISTED E&M SERVICE: CPT | Mod: S$GLB,,, | Performed by: INTERNAL MEDICINE

## 2023-10-03 PROCEDURE — 99499 NO LOS: ICD-10-PCS | Mod: S$GLB,,, | Performed by: INTERNAL MEDICINE

## 2023-10-03 PROCEDURE — 86645 CMV ANTIBODY IGM: CPT | Performed by: INTERNAL MEDICINE

## 2023-10-03 PROCEDURE — 87811 SARS CORONAVIRUS 2 ANTIGEN POCT, MANUAL READ: ICD-10-PCS | Mod: QW,S$GLB,, | Performed by: INTERNAL MEDICINE

## 2023-10-03 PROCEDURE — 80053 COMPREHEN METABOLIC PANEL: CPT | Performed by: INTERNAL MEDICINE

## 2023-10-03 PROCEDURE — 86308 HETEROPHILE ANTIBODY SCREEN: CPT | Performed by: INTERNAL MEDICINE

## 2023-10-03 NOTE — PROGRESS NOTES
"Subjective:      Patient ID: Jann Prakash is a 23 y.o. female.    Vitals:  height is 5' 8" (1.727 m) and weight is 71.7 kg (158 lb). Her temperature is 98.1 °F (36.7 °C). Her blood pressure is 126/75 and her pulse is 80. Her respiration is 18 and oxygen saturation is 100%.     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 Pt reports ears are better.Pt reports she is also having a allergic reaction to something she is having a rash on shoulders and face. Pt reports the rash started 2 days ago and its better today.     Sinus Problem  This is a recurrent problem. The current episode started 1 to 4 weeks ago. The problem has been gradually improving since onset. There has been no fever. Her pain is at a severity of 3/10. The pain is mild. Associated symptoms include chills, congestion, coughing, headaches, a hoarse voice, sinus pressure, sneezing, a sore throat and swollen glands. Pertinent negatives include no diaphoresis, ear pain, neck pain or shortness of breath. (Fatigue  ) Treatments tried: anitibiotics , allergy relief (can not remeber the name of medicine,  gave it to her) The treatment provided mild relief.       Constitution: Positive for chills. Negative for sweating.   HENT:  Positive for congestion, sinus pressure and sore throat. Negative for ear pain.    Neck: Negative for neck pain.   Respiratory:  Positive for cough. Negative for shortness of breath.    Skin:  Negative for erythema.   Allergic/Immunologic: Positive for sneezing.   Neurological:  Positive for headaches.      Objective:     Physical Exam   Constitutional: She is oriented to person, place, and time. She appears well-developed.  Non-toxic appearance. She does not appear ill. No distress.   HENT:   Head: Normocephalic and atraumatic.   Ears:   Right Ear: External ear normal.   Left Ear: External ear normal.   Nose: Nose normal.   Mouth/Throat: Oropharynx is clear and moist. Mucous membranes are moist. No oropharyngeal exudate. " Oropharynx is clear.   Eyes: Conjunctivae and EOM are normal. Pupils are equal, round, and reactive to light. Right eye exhibits no discharge. Left eye exhibits no discharge. No scleral icterus.   Neck: Neck supple.   Cardiovascular: Normal rate, regular rhythm and normal heart sounds.   No murmur heard.Exam reveals no gallop and no friction rub.   Pulmonary/Chest: Effort normal. No respiratory distress. She has no wheezes. She has no rales.   Abdominal: There is no abdominal tenderness.   Lymphadenopathy:     She has cervical adenopathy.   Neurological: She is alert and oriented to person, place, and time.   Skin: Skin is warm, dry, not diaphoretic and no rash. Capillary refill takes less than 2 seconds. No erythema   Psychiatric: Her behavior is normal.   Nursing note and vitals reviewed.      Assessment:     1. Cough, unspecified type    2. Sore throat    3. Fever, unspecified fever cause    4. Fatigue, unspecified type        Plan:       Cough, unspecified type  -     SARS Coronavirus 2 Antigen, POCT Manual Read    Sore throat  -     HETEROPHILE AB SCREEN  -     POCT Strep A, Molecular  -     CBC W/ AUTO DIFFERENTIAL  -     COMPREHENSIVE METABOLIC PANEL  -     HIV 1/2 Ag/Ab (4th Gen)  -     V[place]-BARR VIRUS ANTIBODY PANEL  -     CYTOMEGALOVIRUS (CMV) AB, IGM  -     TSH  -     (Magic mouthwash) 1:1:1 diphenhydrAMINE(Benadryl) 12.5mg/5ml liq, aluminum & magnesium hydroxide-simethicone (Maalox), LIDOcaine viscous 2%; Swish and spit 10 mLs every 4 (four) hours as needed. for mouth sores  Dispense: 360 mL; Refill: 0    Fever, unspecified fever cause  -     HETEROPHILE AB SCREEN  -     POCT Strep A, Molecular  -     CBC W/ AUTO DIFFERENTIAL  -     COMPREHENSIVE METABOLIC PANEL  -     HIV 1/2 Ag/Ab (4th Gen)  -     V[place]-BARR VIRUS ANTIBODY PANEL  -     CYTOMEGALOVIRUS (CMV) AB, IGM  -     TSH  -     (Magic mouthwash) 1:1:1 diphenhydrAMINE(Benadryl) 12.5mg/5ml liq, aluminum & magnesium hydroxide-simethicone  (Maalox), LIDOcaine viscous 2%; Swish and spit 10 mLs every 4 (four) hours as needed. for mouth sores  Dispense: 360 mL; Refill: 0    Fatigue, unspecified type  -     HETEROPHILE AB SCREEN  -     POCT Strep A, Molecular  -     CBC W/ AUTO DIFFERENTIAL  -     COMPREHENSIVE METABOLIC PANEL  -     HIV 1/2 Ag/Ab (4th Gen)  -     VIRGINIA-BARR VIRUS ANTIBODY PANEL  -     CYTOMEGALOVIRUS (CMV) AB, IGM  -     TSH  -     (Magic mouthwash) 1:1:1 diphenhydrAMINE(Benadryl) 12.5mg/5ml liq, aluminum & magnesium hydroxide-simethicone (Maalox), LIDOcaine viscous 2%; Swish and spit 10 mLs every 4 (four) hours as needed. for mouth sores  Dispense: 360 mL; Refill: 0

## 2023-10-03 NOTE — PATIENT INSTRUCTIONS
We will call with lab results. Drink lots of fluids, rest, use ibuprofen/tylenol for aches, pain, fever.

## 2023-10-06 ENCOUNTER — TELEPHONE (OUTPATIENT)
Dept: URGENT CARE | Facility: CLINIC | Age: 23
End: 2023-10-06
Payer: COMMERCIAL

## 2023-10-06 LAB
CMV IGM SERPL IA-ACNC: <8 AU/ML
EBV EA IGG SER-ACNC: 14.5 U/ML
EBV NA IGG SER-ACNC: >600 U/ML
EBV VCA IGG SER-ACNC: 438 U/ML
EBV VCA IGM SER-ACNC: <10 U/ML

## 2023-10-06 NOTE — TELEPHONE ENCOUNTER
Called and LM on VM to call me at [facility].  (Pt is positive for EBV mono.  Will inform her when she calls)  
99

## 2023-10-11 ENCOUNTER — TELEPHONE (OUTPATIENT)
Dept: URGENT CARE | Facility: CLINIC | Age: 23
End: 2023-10-11
Payer: COMMERCIAL

## 2023-10-18 ENCOUNTER — TELEPHONE (OUTPATIENT)
Dept: URGENT CARE | Facility: CLINIC | Age: 23
End: 2023-10-18
Payer: COMMERCIAL

## 2023-10-18 NOTE — TELEPHONE ENCOUNTER
Discussed with patient +mono results. She is aware to follow up with Dr. Rosario regarding her +results and to avoid basketball until he releases her to return. She verb an understanding. Dr. Rosario messaged. Patient is a  here.

## 2023-10-25 ENCOUNTER — TELEPHONE (OUTPATIENT)
Dept: SPORTS MEDICINE | Facility: CLINIC | Age: 23
End: 2023-10-25
Payer: COMMERCIAL

## 2023-10-25 DIAGNOSIS — M25.572 ACUTE LEFT ANKLE PAIN: Primary | ICD-10-CM

## 2023-10-25 NOTE — TELEPHONE ENCOUNTER
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 She was wearing a tall walking boot for 1 week, and has been and working with her . She is currently feeling 60-70% improved overall. She denies numbness/tingling.       Physical Exam:  ANKLE: left   The affected ankle is compared to the contralateral ankle.    Observation:    There is erythema or ecchymosis. Edema at the medial ankle and at the deltoid ligament  Shoes reveal a normal wear pattern.  No structural deformities including pes planus/cavus, hallux valgus, or toe deformities.   Negative too-many toes sign.  Normal callus pattern on the feet bilaterally.    Achilles tendon and calcaneal insertion reveals no deformities  No leg or intrinsic foot muscle atrophy.  Squatting reveals symmetric pronation of the bilateral feet.   Able to rise on toes with symmetric supination.    Normal gait without evidence of antalgia.    ROM: (expected degree)  Active dorsiflexion to 20° bilaterally.    Active plantarflexion to 50° bilaterally.    Active ankle inversion to 35° bilaterally.    Active ankle eversion to 15° bilaterally.    Full active flexion/extension of the toes bilaterally.   Heel cords without tightness bilaterally.    Tenderness To Palpation:  No tenderness at the ATFL, CFL, PTFL  + tenderness over the deltoid ligament  No tenderness over the distal anterior syndesmosis, distal tibia/fibula, fibular head/shaft  No tenderness at lateral malleoli   No tenderness at navicular, cuboid, cuneiforms, talus, or calcaneous  No tenderness along the metatarsals or phalanges  No tenderness at the Achilles tendon calcaneal insertion  No tenderness at the flexor digitorum longus, flexor hallucis longus   No tenderness  at the peroneal tendons  + tenderness medial malleolus   + tenderness posterior tibialis tendon    Strength Testing:  Dorsiflexion - 5/5  Platarflexion - 5/5  Resisted Inversion - 5/5  Resisted Eversion - 5/5  Great Toe Extension - 5/5  Great Toe Flexion - 5/5    Special Tests:  Anterior talar drawer - negative and without dimpling  Talar tilt - negative    Heel tap test - negative  Distal tib/fib squeeze test - negative  External rotation stress (Kleiger) test - negative  Patterson squeeze test - negative    Metatarsal squeeze test - negative  Midfoot stress test - negative  Calcaneal squeeze test - negative    No subluxation of the peroneal tendons with resisted eversion.    Vascular/Sensory Exam:  DP and PT pulses intact BL  No skin changes, no abnormal hair distribution  Sensation intact to light touch throughout the saphenous, sural, superficial peroneal, deep peroneal, and tibial nerve distributions  Capillary refill intact <2 seconds in all toes bilaterally.      Assessment:  Left ankle pain      Plan:     Diagnostic ultrasound used in the athletic training room today.  There is fluid around the posterior tibialis tendon as well as at the area of the deltoid ligament both consistent with the recent injury.  No tibial nerve abnormalities.    Medications - continue with OTC NSAIDs - 600 mg ibuprofen TID    Exercises - hold from contact basketball activity until after imaging (if negative for fracture can return and advance activity as tolerated), ok to continue with individual drills with tape    Referrals - none    Imaging - left ankle x-ray due to tenderness at medial malleoli     Follow up - 1 week in [employer] to ensure continued improvement            This note was completed in the presence of the physician noted above    This note is dictated using the M*Modal Fluency Direct word recognition program. There are word recognition mistakes that are occasionally missed on review.

## 2023-10-26 ENCOUNTER — HOSPITAL ENCOUNTER (OUTPATIENT)
Dept: RADIOLOGY | Facility: HOSPITAL | Age: 23
Discharge: HOME | End: 2023-10-26
Attending: ORTHOPAEDIC SURGERY
Payer: COMMERCIAL

## 2023-10-26 DIAGNOSIS — M25.572 ACUTE LEFT ANKLE PAIN: ICD-10-CM

## 2023-10-26 PROCEDURE — 73610 X-RAY EXAM OF ANKLE: CPT | Mod: 26,LT,, | Performed by: RADIOLOGY

## 2023-10-26 PROCEDURE — 73610 XR ANKLE COMPLETE 3 VIEW LEFT: ICD-10-PCS | Mod: 26,LT,, | Performed by: RADIOLOGY

## 2023-10-26 PROCEDURE — 73610 X-RAY EXAM OF ANKLE: CPT | Mod: TC,PN,LT

## 2023-11-13 ENCOUNTER — OFFICE VISIT (OUTPATIENT)
Dept: URGENT CARE | Facility: CLINIC | Age: 23
End: 2023-11-13
Payer: COMMERCIAL

## 2023-11-13 VITALS
DIASTOLIC BLOOD PRESSURE: 72 MMHG | BODY MASS INDEX: 23.95 KG/M2 | RESPIRATION RATE: 16 BRPM | SYSTOLIC BLOOD PRESSURE: 115 MMHG | TEMPERATURE: 98 F | WEIGHT: 158.06 LBS | HEIGHT: 68 IN | HEART RATE: 80 BPM | OXYGEN SATURATION: 98 %

## 2023-11-13 DIAGNOSIS — J06.9 VIRAL URI WITH COUGH: Primary | ICD-10-CM

## 2023-11-13 LAB
CTP QC/QA: YES
CTP QC/QA: YES
POC MOLECULAR INFLUENZA A AGN: NEGATIVE
POC MOLECULAR INFLUENZA B AGN: NEGATIVE
SARS-COV-2 AG RESP QL IA.RAPID: NEGATIVE

## 2023-11-13 PROCEDURE — 87811 SARS CORONAVIRUS 2 ANTIGEN POCT, MANUAL READ: ICD-10-PCS | Mod: QW,S$GLB,, | Performed by: NURSE PRACTITIONER

## 2023-11-13 PROCEDURE — 87811 SARS-COV-2 COVID19 W/OPTIC: CPT | Mod: QW,S$GLB,, | Performed by: NURSE PRACTITIONER

## 2023-11-13 PROCEDURE — 99499 NO LOS: ICD-10-PCS | Mod: S$GLB,,, | Performed by: NURSE PRACTITIONER

## 2023-11-13 PROCEDURE — 87502 POCT INFLUENZA A/B MOLECULAR: ICD-10-PCS | Mod: QW,S$GLB,, | Performed by: NURSE PRACTITIONER

## 2023-11-13 PROCEDURE — 99499 UNLISTED E&M SERVICE: CPT | Mod: S$GLB,,, | Performed by: NURSE PRACTITIONER

## 2023-11-13 PROCEDURE — 87502 INFLUENZA DNA AMP PROBE: CPT | Mod: QW,S$GLB,, | Performed by: NURSE PRACTITIONER

## 2023-11-13 NOTE — PROGRESS NOTES
"Subjective:      Patient ID: Jann Prakash is a 23 y.o. female.    Vitals:  height is 5' 8" (1.727 m) and weight is 71.7 kg (158 lb 1.1 oz). Her oral temperature is 98 °F (36.7 °C). Her blood pressure is 115/72 and her pulse is 80. Her respiration is 16 and oxygen saturation is 98%.     Chief Complaint: Cough    Pt reports that she has been having a cough, sore throat, nasal congestion, and sweats starting yesterday. Pt reports that she has been feeling fatigue. Pt reports that she didn't take anything for her symptoms     Cough  This is a new problem. The current episode started yesterday. The problem has been unchanged. The problem occurs constantly. The cough is Non-productive. Associated symptoms include myalgias, nasal congestion and a sore throat. Pertinent negatives include no chest pain, chills, ear congestion, ear pain, fever, headaches, heartburn, hemoptysis, postnasal drip, rash, rhinorrhea, shortness of breath, sweats, weight loss or wheezing. She has tried nothing for the symptoms. The treatment provided no relief.       Constitution: Negative for chills and fever.   HENT:  Positive for sore throat. Negative for ear pain and postnasal drip.    Cardiovascular:  Negative for chest pain.   Respiratory:  Positive for cough. Negative for bloody sputum, shortness of breath and wheezing.    Gastrointestinal:  Negative for heartburn.   Musculoskeletal:  Positive for muscle ache.   Skin:  Negative for rash.   Neurological:  Negative for headaches.      Objective:     Physical Exam   Constitutional: She is oriented to person, place, and time. She appears well-developed. She is cooperative.  Non-toxic appearance. She does not appear ill. No distress.   HENT:   Head: Normocephalic and atraumatic.   Ears:   Right Ear: Hearing, tympanic membrane, external ear and ear canal normal.   Left Ear: Hearing, tympanic membrane, external ear and ear canal normal.   Nose: Nose normal. No mucosal edema, rhinorrhea or nasal " deformity. No epistaxis. Right sinus exhibits no maxillary sinus tenderness and no frontal sinus tenderness. Left sinus exhibits no maxillary sinus tenderness and no frontal sinus tenderness.   Mouth/Throat: Uvula is midline, oropharynx is clear and moist and mucous membranes are normal. No trismus in the jaw. Normal dentition. No uvula swelling. No oropharyngeal exudate, posterior oropharyngeal edema or posterior oropharyngeal erythema.   Eyes: Conjunctivae and lids are normal. No scleral icterus.   Neck: Trachea normal and phonation normal. Neck supple. No edema present. No erythema present. No neck rigidity present.   Cardiovascular: Normal rate, regular rhythm, normal heart sounds and normal pulses.   Pulmonary/Chest: Effort normal and breath sounds normal. No respiratory distress. She has no decreased breath sounds. She has no rhonchi.   Abdominal: Normal appearance.   Musculoskeletal: Normal range of motion.         General: No deformity. Normal range of motion.   Neurological: She is alert and oriented to person, place, and time. She exhibits normal muscle tone. Coordination normal.   Skin: Skin is warm, dry, intact, not diaphoretic and not pale.   Psychiatric: Her speech is normal and behavior is normal. Judgment and thought content normal.   Nursing note and vitals reviewed.      Assessment:     1. Viral URI with cough        Plan:       Viral URI with cough  -     SARS Coronavirus 2 Antigen, POCT Manual Read  -     POCT Influenza A/B MOLECULAR    -educated patient on OTC meds and to re-test to rule out COVID since her symptoms began last night    PLEASE READ YOUR DISCHARGE INSTRUCTIONS ENTIRELY AS IT CONTAINS IMPORTANT INFORMATION.      Please drink plenty of fluids.     Please get plenty of rest.     Please return here or go to the Emergency Department for any concerns or worsening of condition.    Take an over the counter antihistamine medication (allegra/Claritin/Zyrtec) of your choice as directed.      Try an over the counter decongestant like Mucinex D or Sudafed if you do not have a history of high blood pressure.  You buy this behind the pharmacy counter.     If you do have high blood pressure or cardiac history, it is safe to take Coricidin HBP for relief of sinus symptoms.     If not allergic, please take over the counter Tylenol (Acetaminophen) and/or Motrin (Ibuprofen) as directed for control of pain and/or fever.  Please follow up with your primary care doctor or specialist as needed.     Sore throat recommendations: Warm fluids, warm salt water gargles, throat lozenges, tea, honey, soup, rest, hydration.     Use over the counter flonase: one spray each nostril twice daily OR two sprays each nostril once daily.      If you smoke, please stop smoking.        Please return or see your primary care doctor if you develop new or worsening symptoms.      Please arrange follow up with your primary medical clinic as soon as possible. You must understand that you've received an Urgent Care treatment only and that you may be released before all of your medical problems are known or treated. You, the patient, will arrange for follow up as instructed. If your symptoms worsen or fail to improve you should go to the Emergency Room.    You must understand that you've received an Urgent Care treatment only and that you may be released before all your medical problems are known or treated. You, the patient, will arrange for follow up care as instructed.  If your condition worsens we recommend that you receive another evaluation at the emergency room immediately or contact your primary medical clinics after hours call service to discuss your concerns.  Please return here or go to the Emergency Department for any concerns or worsening of condition.

## 2023-11-13 NOTE — PATIENT INSTRUCTIONS
PLEASE READ YOUR DISCHARGE INSTRUCTIONS ENTIRELY AS IT CONTAINS IMPORTANT INFORMATION.      Please drink plenty of fluids.     Please get plenty of rest.     Please return here or go to the Emergency Department for any concerns or worsening of condition.    Take an over the counter antihistamine medication (allegra/Claritin/Zyrtec) of your choice as directed.     Try an over the counter decongestant like Mucinex D or Sudafed if you do not have a history of high blood pressure.  You buy this behind the pharmacy counter.     If you do have high blood pressure or cardiac history, it is safe to take Coricidin HBP for relief of sinus symptoms.     If not allergic, please take over the counter Tylenol (Acetaminophen) and/or Motrin (Ibuprofen) as directed for control of pain and/or fever.  Please follow up with your primary care doctor or specialist as needed.     Sore throat recommendations: Warm fluids, warm salt water gargles, throat lozenges, tea, honey, soup, rest, hydration.     Use over the counter flonase: one spray each nostril twice daily OR two sprays each nostril once daily.      If you smoke, please stop smoking.        Please return or see your primary care doctor if you develop new or worsening symptoms.      Please arrange follow up with your primary medical clinic as soon as possible. You must understand that you've received an Urgent Care treatment only and that you may be released before all of your medical problems are known or treated. You, the patient, will arrange for follow up as instructed. If your symptoms worsen or fail to improve you should go to the Emergency Room.

## 2024-01-04 ENCOUNTER — APPOINTMENT (OUTPATIENT)
Dept: RADIOLOGY | Facility: OTHER | Age: 24
End: 2024-01-04
Attending: ORTHOPAEDIC SURGERY
Payer: COMMERCIAL

## 2024-01-04 ENCOUNTER — OFFICE VISIT (OUTPATIENT)
Dept: SPORTS MEDICINE | Facility: CLINIC | Age: 24
End: 2024-01-04
Payer: COMMERCIAL

## 2024-01-04 VITALS
SYSTOLIC BLOOD PRESSURE: 112 MMHG | DIASTOLIC BLOOD PRESSURE: 71 MMHG | WEIGHT: 158 LBS | BODY MASS INDEX: 23.95 KG/M2 | HEIGHT: 68 IN

## 2024-01-04 DIAGNOSIS — M25.561 RIGHT KNEE PAIN, UNSPECIFIED CHRONICITY: ICD-10-CM

## 2024-01-04 DIAGNOSIS — M71.21 POPLITEAL CYST, RIGHT: ICD-10-CM

## 2024-01-04 DIAGNOSIS — M25.561 ACUTE PAIN OF RIGHT KNEE: Primary | ICD-10-CM

## 2024-01-04 PROCEDURE — 3008F BODY MASS INDEX DOCD: CPT | Mod: CPTII,S$GLB,, | Performed by: ORTHOPAEDIC SURGERY

## 2024-01-04 PROCEDURE — 73562 X-RAY EXAM OF KNEE 3: CPT | Mod: 26,LT,, | Performed by: RADIOLOGY

## 2024-01-04 PROCEDURE — 73562 X-RAY EXAM OF KNEE 3: CPT | Mod: 59,TC,PN,LT

## 2024-01-04 PROCEDURE — 1160F RVW MEDS BY RX/DR IN RCRD: CPT | Mod: CPTII,S$GLB,, | Performed by: ORTHOPAEDIC SURGERY

## 2024-01-04 PROCEDURE — 3078F DIAST BP <80 MM HG: CPT | Mod: CPTII,S$GLB,, | Performed by: ORTHOPAEDIC SURGERY

## 2024-01-04 PROCEDURE — 99204 OFFICE O/P NEW MOD 45 MIN: CPT | Mod: 25,S$GLB,, | Performed by: ORTHOPAEDIC SURGERY

## 2024-01-04 PROCEDURE — 20611 DRAIN/INJ JOINT/BURSA W/US: CPT | Mod: RT,S$GLB,, | Performed by: ORTHOPAEDIC SURGERY

## 2024-01-04 PROCEDURE — 99999 PR PBB SHADOW E&M-EST. PATIENT-LVL III: CPT | Mod: PBBFAC,,, | Performed by: ORTHOPAEDIC SURGERY

## 2024-01-04 PROCEDURE — 3074F SYST BP LT 130 MM HG: CPT | Mod: CPTII,S$GLB,, | Performed by: ORTHOPAEDIC SURGERY

## 2024-01-04 PROCEDURE — 1159F MED LIST DOCD IN RCRD: CPT | Mod: CPTII,S$GLB,, | Performed by: ORTHOPAEDIC SURGERY

## 2024-01-04 PROCEDURE — 73564 X-RAY EXAM KNEE 4 OR MORE: CPT | Mod: 26,RT,, | Performed by: RADIOLOGY

## 2024-01-04 NOTE — PROGRESS NOTES
"CC: right knee pain    23 y.o. Female presents today for evaluation of her right knee pain. 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 She denies this problem keeping her from basketball activity but does have the most pain when she extends her knee.   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 No pertinent past medical history.    PAST SURGICAL HISTORY:   History reviewed. No pertinent surgical history.    FAMILY HISTORY:   History reviewed. No pertinent family history.    SOCIAL HISTORY:   Social History     Socioeconomic History    Marital status: Single   Tobacco Use    Smoking status: Never     Passive exposure: Never    Smokeless tobacco: Never       MEDICATIONS:   No current outpatient medications on file.    ALLERGIES:   Review of patient's allergies indicates:  No Known Allergies     PHYSICAL EXAMINATION:  /71   Ht 5' 8" (1.727 m)   Wt 71.7 kg (158 lb)   BMI 24.02 kg/m²   Vitals signs and nursing note have been reviewed.  General: In no acute distress, well developed, well nourished, no diaphoresis  Eyes: EOM full and smooth, no eye redness or discharge  HENT: normocephalic and atraumatic, neck supple, trachea midline, no nasal discharge, no external ear redness or discharge  Cardiovascular: 2+ and symmetric DP pulses bilaterally, no LE edema  Lungs: respirations non-labored, no conversational dyspnea   Abd: non-distended, no rigidity  MSK: no amputation or deformity, no swelling of extremities  Neuro: AAOx3, CN2-12 grossly intact  Skin: No rashes, warm and " dry  Psychiatric: cooperative, pleasant, mood and affect appropriate for age    MUSCULOSKELETAL EXAM:    RIGHT KNEE EXAMINATION   Affected side is compared to contralateral knee     Observation:  No edema, erythema, ecchymosis, or effusion noted.  No muscle atrophy of the thighs and calves noted.  No obvious bony deformities noted.   No genu valgus/varum noted. No recurvatum noted.      Tenderness:   Patella - none    Lateral joint line - none  Quad tendon - none   Medial joint line - none  Patellar tendon - positive  Medial plica - none  Tibial tubercle - none   Lateral plica - none  Pes anserine - none   MCL prox - none  Distal ITB - none   MCL distal - none  MFC - none    LCL prox - none  LFC - none    LCL distal - none  Tibia - none    Fibula - none    No obvious bursae, plicae, or tendon derangement palpated.    + tenderness posterior knee, + palpable and painful popliteal cyst          ROM:  Active extension to 0° on left without hyperextension, lag, crepitus, or patellar J sign.   Active extension to 0° on right without hyperextension, lag, crepitus, or patellar J sign.   Active flexion to 135° on left and 130° on right. Decreased on right due to pain.    Strength: (bilaterally) (*pain)  Knee Flexion - 5/5 on left and 5/5 on right  Knee Extension - 5/5 on left and 5/5 on right  Hip Flexion - 5/5 on left and 5/5 on right  Hip Extension - 5/5 on left and 5/5 on right  Ankle dorsiflexion - 5/5 on left and 5/5 on right  Ankle Plantarflexion - 5/5 on left and 5/5 on right    Patellofemoral Exam:  Patellar ballottement - negative  Bulge sign - negative  Patellar grind - negative  No patellar laxity with medial and lateral translation   No apprehension with medial and lateral patellar translation.     Meniscus Testing:     + pain if posterior knee with flexion and extension  Lucass test - negative   Bounce home test - negative    Ligament Testing:  Lachman's test - negative  No laxity with anterior drawer.  No  "laxity with posterior drawer.    No laxity with varus testing at 0 and 30 degrees.  No laxity with valgus testing at 0 and 30 degrees.    IT Band Testing:  Noble Compression test - negative    Neurovascular Examination:   Normal gait without antalgia.  Sensation intact to light touch in the obturator, lateral/intermediate/medial/posterior femoral cutaneous, saphenous, and common peroneal nerves bilaterally.  Pulses intact at the DP and PT arteries bilaterally.    Capillary refill intact <2 seconds in all toes bilaterally.    IMAGIN. X-ray ordered due to right knee pain   2. X-ray images were reviewed personally by me and then directly with patient.  3. FINDINGS: X-ray images obtained demonstrate no fracture or dislocation, joint spaces maintained  4. IMPRESSION: As above.     Large Joint Aspiration/Injection  Popliteal cyst, right   Performed by: MITESH FULTON.  Authorized by: MITESH FULTON  Indications: Pain and swelling from cyst  Site marked: The procedure site was marked   Timeout: Prior to procedure the correct patient, procedure, and site was verified   Location: Knee joint, right  Prep: Patient was prepped with Chlorhexidine and alcohol.  Skin anesthetic: Ethyl Chloride spray was used prior to skin puncture. After cold spray was applied, 2 cc's of 1% lidocaine was injected into the skin and superficial tissue at the injection site using a 25 G, 1.5" needle to form an anesthetic tunnel and ensure proper placement of the needle into the cyst.  Ultrasound Guidance for needle placement: yes  Needle size: After local anesthetic was applied a 18 G, 3.5 needle was used to enter the popliteal under US guidance and 8 of clear synovial fluid was aspirated.   Approach: posterior  Medications: 40 mg triamcinolone acetonide 40 mg/mL  Patient tolerance: Patient tolerated the procedure well with no immediate complications  Dressing: Band-aid was placed over injection site following the procedure and a compression " dressing was placed around knee.      Ultrasound guidance was used for needle localization with SonoSite Edge 2, 9-L MHz linear probe(s). The popliteal artery and vein were visualized prior to the procedure with US.  Images were saved and stored for documentation. Dynamic visualization of the needle(s) was continuous throughout the procedure and maintained good position and correct needle placement.        ASSESSMENT:      ICD-10-CM ICD-9-CM   1. Acute pain of right knee  M25.561 719.46   2. Popliteal cyst, right  M71.21 727.51       PLAN:  1-2. 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      - XRs ordered in the office today and images were personally reviewed with the patient. See above for further detail.    - Diagnostic US performed in office which confirmed popliteal cyst. Options discussed including aspiration (diagnostic and potentially therapeutic), icing/compression/NSAID.     - Symptoms, exam, and imaging are most consistent with popliteal cyst causing compression on the surrounding MSK structures.     - After discussing options, she elects to proceed with ultrasound guided left knee popliteal cyst aspiration under ultrasound guidance. See above for procedure detail.     - OK to continue with basketball activity as tolerated.     - Contact has been made with their  who is on board with the plan.       Future planning includes - possible repeat aspiration with CSI, MRI knee if not improving or if symptoms worsen    All questions were answered to the best of my ability and all concerns were addressed at this time.    Follow up pending response to procedure today.       This note is dictated using the M*Modal Fluency Direct word recognition program. There are word recognition mistakes that are occasionally missed on review.

## 2024-01-06 ENCOUNTER — ATHLETIC TRAINING SESSION (OUTPATIENT)
Dept: SPORTS MEDICINE | Facility: CLINIC | Age: 24
End: 2024-01-06
Payer: COMMERCIAL

## 2024-01-07 NOTE — PROGRESS NOTES
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 She states that it felt like her ankle actually touch the floor but doing remember feeling or hearing a pop. Her pain scale was 7/10 ( sharp) which is located on the lateral side f her ankleand she was not able to finish practice. She was able to limp to the training room and refused to be placed in a boot. After practice, she did an ice bucket and worked on AROM. On Wednesday, she got taped and tired to do her individuals but was not able too due to difficulty of movement, so during practice she rehab. She was able to get through a full practice on Friday, while in her ankle and did not complain about it.     Handedness: right-handed  Sport played: basketball      Level: college      Position:gaurd      Injury  This is a new problem. The current episode started in the past 7 days. The problem occurs every several days. The problem has been gradually improving. The symptoms are aggravated by walking. She has tried NSAIDs and ice for the symptoms. The treatment provided mild relief.   Pain  This is a new problem. The current episode started in the past 7 days. The problem occurs intermittently. The problem has been gradually improving. The symptoms are aggravated by walking. She has tried ice for the symptoms. The treatment provided mild relief.   Swelling  This is a new problem. The current episode started in the past 7 days. The problem occurs intermittently. The problem has been resolved. She has tried ice for the symptoms. The treatment provided mild relief.       ROS              Objective:       General: Jann is well-developed, well-nourished, appears stated age, in no acute distress, alert and oriented to time,  place and person.         General Musculoskeletal Exam   Gait: antalgic     Right Ankle/Foot Exam     Inspection   Scars: absent  Deformity: absent  Erythema: absent  Bruising: Ankle - absent Foot - absent  Effusion: Ankle - absent Foot - absent  Atrophy: Ankle - absent Foot - absent    Swelling   The patient is swollen on the anterior talofibular ligament and lateral malleolus.    Tenderness   The patient is tender to palpation of the ATF and lateral malleolus.    Pain   The patient exhibits pain of the anterior talofibular ligament and lateral malleolus.    Range of Motion   Ankle Joint   Dorsiflexion:  normal   Plantar flexion:  normal   Subtalar Joint   Inversion:  abnormal   Eversion:  normal   Patterson Test:  negative  First MTP Joint: normal    Tests   Anterior drawer: positive  Varus tilt: positive  External Rotation Test: negative  Squeeze Test: negative    Other   Ankle Crepitus: absent  Foot Crepitus:  absent  Peroneal Subluxation: negative    Comments:  Anterior drawer and varus tilt cause sharp pain    Left Ankle/Foot Exam   Left ankle exam is normal.              Assessment:     Status: F - Full Participation    Date Seen:  1/2/2024    Date of Injury:  1/2/2024    Date Out:  N/A    Date Cleared:  1/5/2024      Plan:   Jann completed:    [x]  INJURY TREATMENT   []  MAINTENANCE  DATE OF SERVICE: 1/4/2024  INJURY/CONDITON: R ankle    Jann received the selected modalities after being cleared for contradictions.  Jann received education on potenital side effects of the selected modalities and agreed to treatment.         Electrotherapy Waveform   (AC/DC) Modulation (Cont./Interrupted/Surged) Intensity   (V) Pulse Width/Dur.  (uS) Pulse Rate/Freq.  (Hz, PPS or CPS) Duration  (Mins) Add. Tx Parameters / Comment   []Combo          []E-Stim - IFC          [x]E-Stim - Premod DC Cont. 15   15mins    []E-Stim - [place]          []E-Stim - TENS          []E-Stim - Other          []Iontophoresis        Meds:        [x]  INJURY TREATMENT   []  MAINTENANCE  DATE OF SERVICE: 1/3/2024  INJURY/CONDITON: R ankle    Jann received the selected modalities after being cleared for contradictions.  Jann received education on potenital side effects of the selected modalities and agreed to treatment.    THERAPEUTIC EXERCISES:    Stretching Cardio Rehab Other   []Stretching - Active []Cardio - Bike [x]Rehab - Ankle/Foot []Agility []PNF   []Stretching - Dynamic []Cardio - Elliptical []Rehab - Knee []Balance []ROM - Active   []Stretching - Passive []Cardio - Jog/Run []Rehab - Hip []Blood Flow Restriction []ROM - Passive   []Stretching - PNF []Cardio - Treadmill []Rehab - Wrist/Hand []Foam Roller []RTP - Concussion Protocol   []Stretching - Static []Cardio - Upper Body Ergometer []Rehab - Elbow []Functional Exercises []RTP - Sport Specific    []Cardio - Walk []Rehab - Shoulder []Joint Mobilization []Strengthening Exercises     []Rehab - Neck/Spine []Manual Therapy []Other:     []Rehab - Back []Plyometric Exercises      []Rehab - Other           Exercise Reps/Sets/Time Weight #   AROM (DF, PF & side to side) 2x50    Anlkle [place] (CW &CCW) 2x25    4way ankle 4x10                                         Comment:      Miscellaneous Add. Tx Parameters / Comment   []Compression Wrap    []Support Wrap    []Taping - Preventative    [x]Taping - Injured Part ankle   []Wound Care    []Other:      Comment:         [x]  INJURY TREATMENT   []  MAINTENANCE  DATE OF SERVICE: 1/2/2024  INJURY/CONDITON: R ankle    Jann received the selected modalities after being cleared for contradictions.  Jann received education on potenital side effects of the selected modalities and agreed to treatment.      MODALITIES:    Cryotherapy / Thermotherapy Duration  (Mins) Add. Tx Parameters / Comment   [x]Cold Tub / Whirlpool (50-60 F) 10mins Ice bucket   []Contrast Bath (105-110 F & 50-65 F)     []Game Ready     []Hot Pack     []Hot Tub / Whirlpool ( F)     []Ice  Massage     []Ice Pack     []Paraffin Wax (126-130 F)     []Vapocoolant Spray              THERAPEUTIC EXERCISES:    Stretching Cardio Rehab Other   []Stretching - Active []Cardio - Bike [x]Rehab - Ankle/Foot []Agility []PNF   []Stretching - Dynamic []Cardio - Elliptical []Rehab - Knee []Balance []ROM - Active   []Stretching - Passive []Cardio - Jog/Run []Rehab - Hip []Blood Flow Restriction []ROM - Passive   []Stretching - PNF []Cardio - Treadmill []Rehab - Wrist/Hand []Foam Roller []RTP - Concussion Protocol   []Stretching - Static []Cardio - Upper Body Ergometer []Rehab - Elbow []Functional Exercises []RTP - Sport Specific    []Cardio - Walk []Rehab - Shoulder []Joint Mobilization []Strengthening Exercises     []Rehab - Neck/Spine []Manual Therapy []Other:     []Rehab - Back []Plyometric Exercises      []Rehab - Other           Exercise Reps/Sets/Time Weight #   AROM (DF, PF & side to side) 2x50    Ankle [place] (CW & CCW) 2x25                                                1. Keep doing treatments as needed.  2. Physician Referral: no  3. ED Referral: no  4. Parent/Guardian Notified: No  5. All questions were answered, ath. will contact me for questions or concerns in  the interim.  6.         Eligible to use School Insurance: Yes

## 2024-01-11 DIAGNOSIS — M71.21 POPLITEAL CYST, RIGHT: ICD-10-CM

## 2024-01-11 DIAGNOSIS — M25.561 ACUTE PAIN OF RIGHT KNEE: Primary | ICD-10-CM

## 2024-01-11 NOTE — PROGRESS NOTES
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  X-ray was obtained and was negative for pathology.  Diagnostic ultrasound was performed and she was noted to have a popliteal cyst.  After discussion, we elected to proceed with trying to drain the cyst to see if symptoms improve and also to visualize the cystic fluid.  The procedure was successful in aspirating the cyst and she appreciated symptom improvement following.  She did not complain about her knee for 4-5 days following the procedure, but states that recently the pain in the back of the knee has returned.  She denies any new injury or trauma.    Due to return of symptoms and necessity to further evaluate the internal structures of the knee, an MRI has been ordered.  See note from 01/04/2024 for further detail regarding the knee examination and procedure referenced to above.    Next steps in management pending MRI.  Possible referral to orthopedic surgeon.  Possible trial of repeat aspiration but this time with cortisone.

## 2024-01-14 ENCOUNTER — ATHLETIC TRAINING SESSION (OUTPATIENT)
Dept: SPORTS MEDICINE | Facility: CLINIC | Age: 24
End: 2024-01-14
Payer: COMMERCIAL

## 2024-01-15 NOTE — PROGRESS NOTES
Subjective:       Chief Complaint: Jann Prakash is a 23 y.o. female student at [employer] who had concerns including Pain and Swelling of the Right Knee.    Jann has been having a pain behind her R knee for a month. The pain came out of no where and it produce minimal swelling and she states that she feels a warm degroot. She saw Dr. Rosario on 1/5, where he discover a Cyst and drained it. She didn't have pain and was practicing without any symptoms until 1/9. She hasn't practice since 1/9 and complains of the warm rush from theh distal hamstring behind the knee to the calf.     Handedness: right-handed  Sport played: basketball      Level: college      Position:gaurd      Pain  This is a recurrent problem. The current episode started more than 1 month ago. The problem occurs constantly. The problem has been gradually worsening. The symptoms are aggravated by bending, walking and standing. She has tried ice for the symptoms. The treatment provided mild relief.       ROS              Objective:       General: Jann is well-developed, well-nourished, appears stated age, in no acute distress, alert and oriented to time, place and person.             Right Ankle/Foot Exam     Tests   Heel Walk: able to perform  Tiptoe Walk: able to perform  Single Heel Rise: able to perform  Double Heel Rise: unable to perform double heel rise      Right Knee Exam     Inspection   Erythema: absent  Scars: absent  Swelling: present  Effusion: absent  Deformity: absent  Bruising: present    Tenderness   The patient is experiencing no tenderness.     Range of Motion   The patient has normal right knee ROM.    Tests   Meniscus   Lucas:  Medial - negative Lateral - negative  Ligament Examination   Lachman: normal (-1 to 2mm)   PCL-Posterior Drawer: abnormal     MCL - Valgus: normal (0 to 2mm)  LCL - Varus: normal  Pivot Shift: normal (Equal)  Reverse Pivot Shift: abnormal  Dial Test at 30 degrees: normal (< 5 degrees)  Dial Test at 90 degrees:  normal (< 5 degrees)  Posterior Sag Test: negative  Posterolateral Corner: unstable (>15 degrees difference)    Other   Meniscal Cyst: absent  Popliteal (Baker's) Cyst: present  Apley Grind Test: negative  Home Bounce Test: negative  Thessaly's Test: negative    Comments:  Those test just produce pain within the area.    Left Knee Exam   Left knee exam is normal.    Right Hip Exam     Inspection   Quadriceps Atrophy:  Negative  Back (L-Spine & T-Spine) / Neck (C-Spine) Exam     Back (L-Spine & T-Spine) Tests   Right Side Tests  Squat Test: unable to perform      Muscle Strength   Right Lower Extremity   Hip Abduction: 5/5   Quadriceps:  5/5   Hamstrin/5             Assessment:     Status: O - Out    Date Seen:  23    Date of Injury:  2023    Date Out:  2024    Date Cleared:  TBD      Plan:       1. Referring to doctor and getting imaging.  2. Physician Referral: yes  3. ED Referral: no  4. Parent/Guardian Notified: No  5. All questions were answered, ath. will contact me for questions or concerns in  the interim.  6.         Eligible to use School Insurance: Yes

## 2024-01-17 DIAGNOSIS — M71.21 POPLITEAL CYST, RIGHT: ICD-10-CM

## 2024-01-17 DIAGNOSIS — M25.561 ACUTE PAIN OF RIGHT KNEE: Primary | ICD-10-CM

## 2024-01-17 RX ORDER — MELOXICAM 15 MG/1
15 TABLET ORAL DAILY
Qty: 30 TABLET | Refills: 0 | Status: SHIPPED | OUTPATIENT
Start: 2024-01-17 | End: 2024-02-06

## 2024-01-17 NOTE — TELEPHONE ENCOUNTER
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 She had right knee popliteal cyst aspiration at visit 01/04/2024 and admits to appreciating 2 days of symptom resolution following. She states her pain and symptoms have returned and also endorses right anterior knee pain and tightness.     Physical Exam:  MUSCULOSKELETAL EXAM:    RIGHT KNEE EXAMINATION   Affected side is compared to contralateral knee     Observation:  No erythema, ecchymosis, or effusion noted.  Edema in the popliteal fossa on the right  No muscle atrophy of the thighs and calves noted.  No obvious bony deformities noted.   No patella mark or baja noted.  No genu valgus/varum noted. No recurvatum noted.    No tibial internal/external torsion.    No pes planus/cavus.    Tenderness:  Patella - none    Lateral joint line - none  Quad tendon - positive  Medial joint line - positive  Patellar tendon - positive  Medial plica - none  Tibial tubercle - none   Lateral plica - none  Pes anserine - none   MCL prox - none  Distal ITB - none   MCL distal - none  MFC - none    LCL prox - none  LFC - none    LCL distal - none  Tibia - none    Fibula - none    No obvious bursae, plicae, or tendon derangement palpated.  + popliteal cyst palpated          ROM:   Active extension to 0° bilaterally without hyperextension, lag, crepitus, or patellar J sign.  Active flexion to 135° bilaterally.    Strength: (bilaterally)  Knee Flexion - 5/5  Knee Extension - 5/5  Hip Flexion - 5/5  Hip Extension - 5/5  Ankle dorsiflexion - 5/5  Ankle Plantarflexion - 5/5    Patellofemoral Exam:  Patella position - Neutral   Patellar ballottement - negative  Bulge sign - negative  Patellar grind - negative  No patellar laxity with medial and lateral translation   No apprehension with medial  and lateral patellar translation.     Meniscus Testing:     + pain with terminal extension and flexion.  Lucass test - pain at joint lines but not palpable click   Bounce home test - negative    Ligament Testing:  Lachman's test - negative  No laxity with varus testing at 0 and 30 degrees.  No laxity with valgus testing at 0 and 30 degrees.      Assessment:  Right knee pain  Popliteal cyst      Plan:     Jann found short term but significant improvement post aspiration of the popliteal cyst done at her last clinic visit.  Symptoms were better for 2-3 days and then started to return.  She is now having pain and tightness in the posterior knee and also anteriorly as well.  She denies any new injury or trauma.  She has been somewhat involved in basketball activity but has been limited because of her knee symptoms.    Medications - Mobic 15 mg prescribed today    Exercises - knee strengthening/stabilization     Referrals - pending MRI results    Imaging - MRI right knee due to recurrence of swelling and to assess for possible causes of fluid production.    ATR - Sport Participation: Full sport participation as tolerated    Follow up - pending MRI results, possible repeat aspiration with CSI, possible referral to orthopedic surgery if findings that would benefit from surgical intervention are identified            This note was completed in the presence of the physician noted above    This note is dictated using the M*Modal Fluency Direct word recognition program. There are word recognition mistakes that are occasionally missed on review.

## 2024-01-30 ENCOUNTER — OFFICE VISIT (OUTPATIENT)
Dept: URGENT CARE | Facility: CLINIC | Age: 24
End: 2024-01-30
Payer: COMMERCIAL

## 2024-01-30 VITALS
SYSTOLIC BLOOD PRESSURE: 117 MMHG | HEIGHT: 68 IN | TEMPERATURE: 98 F | BODY MASS INDEX: 23.95 KG/M2 | RESPIRATION RATE: 19 BRPM | HEART RATE: 101 BPM | WEIGHT: 158 LBS | OXYGEN SATURATION: 99 % | DIASTOLIC BLOOD PRESSURE: 72 MMHG

## 2024-01-30 DIAGNOSIS — J03.90 TONSILLITIS: ICD-10-CM

## 2024-01-30 DIAGNOSIS — K12.0 CANKER SORES ORAL: Primary | ICD-10-CM

## 2024-01-30 PROCEDURE — 96372 THER/PROPH/DIAG INJ SC/IM: CPT | Mod: S$GLB,,, | Performed by: INTERNAL MEDICINE

## 2024-01-30 PROCEDURE — 99499 UNLISTED E&M SERVICE: CPT | Mod: S$GLB,,, | Performed by: INTERNAL MEDICINE

## 2024-01-30 RX ORDER — AMOXICILLIN 500 MG/1
500 TABLET, FILM COATED ORAL 2 TIMES DAILY
COMMUNITY
Start: 2024-01-28

## 2024-01-30 RX ORDER — DEXAMETHASONE SODIUM PHOSPHATE 4 MG/ML
8 INJECTION, SOLUTION INTRA-ARTICULAR; INTRALESIONAL; INTRAMUSCULAR; INTRAVENOUS; SOFT TISSUE
Status: COMPLETED | OUTPATIENT
Start: 2024-01-30 | End: 2024-01-30

## 2024-01-30 RX ADMIN — DEXAMETHASONE SODIUM PHOSPHATE 8 MG: 4 INJECTION, SOLUTION INTRA-ARTICULAR; INTRALESIONAL; INTRAMUSCULAR; INTRAVENOUS; SOFT TISSUE at 01:01

## 2024-01-30 NOTE — PROGRESS NOTES
"Subjective:      Patient ID: Jann Prakash is a 23 y.o. female.    Vitals:  height is 5' 8" (1.727 m) and weight is 71.7 kg (158 lb). Her temperature is 98.4 °F (36.9 °C). Her blood pressure is 117/72 and her pulse is 101. Her respiration is 19 and oxygen saturation is 99%.     Chief Complaint: Sore Throat    Pt states she was seen at a urgent care 2 days ago and was Dx with at throat Abscess also tested for mono and strep both negative. Pt declined step during triage     Sore Throat   This is a new problem. The problem has been gradually worsening. Sore throat worse side: both sides. There has been no fever. The pain is at a severity of 10/10. Associated symptoms include swollen glands and trouble swallowing. Pertinent negatives include no ear pain, headaches, hoarse voice, plugged ear sensation, neck pain, shortness of breath, stridor or vomiting. She has had no exposure to strep or mono. She has tried NSAIDs (antibiotics) for the symptoms. The treatment provided no relief.       HENT:  Positive for sore throat and trouble swallowing. Negative for ear pain.    Neck: Negative for neck pain.   Respiratory:  Negative for shortness of breath and stridor.    Gastrointestinal:  Negative for vomiting.   Neurological:  Negative for headaches.      Objective:     Physical Exam   Constitutional: She is oriented to person, place, and time.  Non-toxic appearance. She does not appear ill. No distress.   HENT:   Head: Normocephalic and atraumatic.   Ears:   Right Ear: External ear normal.   Left Ear: External ear normal.   Nose: Nose normal. No congestion.   Mouth/Throat: Posterior oropharyngeal erythema present. No oropharyngeal exudate, posterior oropharyngeal edema or tonsillar abscesses.       No uvular deviation. Slight swelling of left tonsil compared to right. No mass effect. Unlikely underlying pus or fluid collection.       Comments: No uvular deviation. Slight swelling of left tonsil compared to right. No mass " effect. Unlikely underlying pus or fluid collection.   Pulmonary/Chest: No respiratory distress.   Abdominal: Normal appearance.   Neurological: She is alert and oriented to person, place, and time.   Skin: Skin is warm, dry and not diaphoretic.   Psychiatric: Her behavior is normal. Mood, judgment and thought content normal.   Vitals reviewed.      Assessment:     1. Canker sores oral    2. Tonsillitis        Plan:       Canker sores oral    Tonsillitis  -     dexAMETHasone injection 8 mg  -     (Magic mouthwash) 1:1:1 diphenhydrAMINE(Benadryl) 12.5mg/5ml liq, aluminum & magnesium hydroxide-simethicone (Maalox), LIDOcaine viscous 2%; Swish and spit 10 mLs every 4 (four) hours as needed. for mouth sores  Dispense: 360 mL; Refill: 0    Patient declined all testing. Will give steroid injection and then continue Amoxicillin.

## 2024-01-31 ENCOUNTER — HOSPITAL ENCOUNTER (OUTPATIENT)
Dept: RADIOLOGY | Facility: HOSPITAL | Age: 24
Discharge: HOME | End: 2024-01-31
Attending: ORTHOPAEDIC SURGERY
Payer: COMMERCIAL

## 2024-01-31 ENCOUNTER — ATHLETIC TRAINING SESSION (OUTPATIENT)
Dept: SPORTS MEDICINE | Facility: CLINIC | Age: 24
End: 2024-01-31
Payer: COMMERCIAL

## 2024-01-31 DIAGNOSIS — M71.21 POPLITEAL CYST, RIGHT: ICD-10-CM

## 2024-01-31 DIAGNOSIS — M25.561 ACUTE PAIN OF RIGHT KNEE: ICD-10-CM

## 2024-01-31 PROCEDURE — 73721 MRI JNT OF LWR EXTRE W/O DYE: CPT | Mod: TC,RT

## 2024-01-31 PROCEDURE — 73721 MRI JNT OF LWR EXTRE W/O DYE: CPT | Mod: 26,RT,, | Performed by: INTERNAL MEDICINE

## 2024-02-01 NOTE — PROGRESS NOTES
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    Jann seen Dr. Rosario on 1/17 about her R knee again because it started bothering her again but now has pain in the back and front of the knee.Dr. Rosario states that she is inflamed to the patella and quad popliteal tendon and he also thinks that the cyst can have more fluid in it again. He schedules MRI on 1/31 to r/o any other injuries to the Right knee. Jann attempts to practice on 1/22 and doesn't finish practice and on 1/23 she does finish practice but complains that it hurts to much to run extra. On 1/24 & 25 she calls one of the assistant complain on how bad the pain is but hasn't com in for treatment during the week. I decide to remove her from anything involving basketball and lifting until we get results back from MRI.    Jann has been having a pain behind her R knee for a month. The pain came out of no where and it produce minimal swelling and she states that she feels a warm degroot. She saw Dr. Rosario on 1/5, where he discover a Cyst and drained it. She didn't have pain and was practicing without any symptoms until 1/9. She hasn't practice since 1/9 and complains of the warm rush from theh distal hamstring behind the knee to the calf.     Handedness: right-handed  Sport played: basketball      Level: college      Position:gaurd      Pain  This is a recurrent problem. The current episode started more than 1 month ago. The problem occurs constantly. The problem has been gradually worsening. The symptoms are aggravated by bending, walking and standing. She has tried ice for the symptoms. The treatment provided mild relief.       ROS              Objective:       General: Jann is well-developed, well-nourished, appears stated age, in no acute distress, alert and oriented to time, place and person.             Right Ankle/Foot Exam     Tests   Heel Walk: able to  perform  Tiptoe Walk: able to perform  Single Heel Rise: able to perform  Double Heel Rise: able to perform      Right Knee Exam     Inspection   Erythema: absent  Scars: absent  Swelling: present  Effusion: absent  Deformity: absent  Bruising: absent    Tenderness   The patient is tender to palpation of the patellar tendon.    Range of Motion   The patient has normal right knee ROM.    Tests   Meniscus   Lucas:  Medial - negative Lateral - negative  Ligament Examination   Lachman: normal (-1 to 2mm)   PCL-Posterior Drawer: abnormal     MCL - Valgus: normal (0 to 2mm)  LCL - Varus: normal  Pivot Shift: normal (Equal)  Reverse Pivot Shift: abnormal  Dial Test at 30 degrees: normal (< 5 degrees)  Dial Test at 90 degrees: normal (< 5 degrees)  Posterior Sag Test: negative  Posterolateral Corner: unstable (>15 degrees difference)    Other   Meniscal Cyst: absent  Popliteal (Baker's) Cyst: present  Apley Grind Test: negative  Home Bounce Test: negative  Thessaly's Test: negative    Comments:  Those test just produce pain within the area.    Left Knee Exam   Left knee exam is normal.    Right Hip Exam     Inspection   Quadriceps Atrophy:  Negative  Back (L-Spine & T-Spine) / Neck (C-Spine) Exam     Back (L-Spine & T-Spine) Tests   Right Side Tests  Squat Test: unable to perform      Muscle Strength   Right Lower Extremity   Hip Abduction: 5/5   Quadriceps:  4/5   Hamstrin/5             Assessment:     Status: O - Out    Date Seen:  23    Date of Injury:  2023    Date Out:  2024    Date Cleared:  TBD      Plan:   Jann completed:    [x]  INJURY TREATMENT   []  MAINTENANCE  DATE OF SERVICE: 2024  INJURY/CONDITON: R Knee    Jann received the selected modalities after being cleared for contradictions.  Jann received education on potenital side effects of the selected modalities and agreed to treatment.      MODALITIES:    Cryotherapy / Thermotherapy Duration  (Mins) Add. Tx Parameters / Comment    []Cold Tub / Whirlpool (50-60 F)     []Contrast Bath (105-110 F & 50-65 F)     [x]Game Ready 20    []Hot Pack     []Hot Tub / Whirlpool ( F)     []Ice Massage     []Ice Pack     []Paraffin Wax (126-130 F)     []Vapocoolant Spray       [x]  INJURY TREATMENT   []  MAINTENANCE  DATE OF SERVICE: 1.22.2024  INJURY/CONDITON: R knee    Jann received the selected modalities after being cleared for contradictions.  Jann received education on potenital side effects of the selected modalities and agreed to treatment.      MODALITIES:    Cryotherapy / Thermotherapy Duration  (Mins) Add. Tx Parameters / Comment   []Cold Tub / Whirlpool (50-60 F)     []Contrast Bath (105-110 F & 50-65 F)     [x]Game Ready 20    []Hot Pack     []Hot Tub / Whirlpool ( F)     []Ice Massage     []Ice Pack     []Paraffin Wax (126-130 F)     []Vapocoolant Spray         1. Getting MRI.  2. Physician Referral: yes  3. ED Referral: no  4. Parent/Guardian Notified: No  5. All questions were answered, ath. will contact me for questions or concerns in  the interim.  6.         Eligible to use School Insurance: Yes

## 2024-02-05 NOTE — PROGRESS NOTES
CC: right knee pain    Jann is here today for follow up evaluation of her right knee pain. Patient reports his pain is 6/10 today. She admits to 1 day of complete improvement in her pain following Baker's cyst aspiration at visit 2024 but states her pain has returned and she has been unable to play as a result. She also had an MRI obtained following the last aspiration due to persistent symptoms which did not show any ligamentous or meniscal or cartilage pathology.  She is still experiencing the sensation of instability with basketball activity.  She denies new falls or trauma since her last visit.    Recall from visit on 2024  23 y.o. Female presents today for evaluation of her right knee pain. 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 She denies this problem keeping her from basketball activity but does have the most pain when she extends her knee.   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 No pertinent past medical history.    PAST SURGICAL HISTORY:   History reviewed. No pertinent surgical history.    FAMILY HISTORY:   History reviewed. No pertinent family history.    SOCIAL HISTORY:   Social History     Socioeconomic History    Marital status: Single   Tobacco Use    Smoking status: Never     Passive exposure: Never    Smokeless tobacco: Never       MEDICATIONS:     Current Outpatient Medications:     amoxicillin (AMOXIL) 500 MG Tab, Take 500 mg by mouth 2 (two) times daily., Disp: , Rfl:   No current facility-administered medications for this visit.    ALLERGIES:  "  Review of patient's allergies indicates:  No Known Allergies     PHYSICAL EXAMINATION:  /67   Pulse 77   Ht 5' 8" (1.727 m)   Wt 75 kg (165 lb 5.5 oz)   LMP 01/17/2024 (Approximate)   BMI 25.14 kg/m²   Vitals signs and nursing note have been reviewed.  General: In no acute distress, well developed, well nourished, no diaphoresis  Eyes: EOM full and smooth, no eye redness or discharge  HENT: normocephalic and atraumatic, neck supple, trachea midline, no nasal discharge, no external ear redness or discharge  Cardiovascular: 2+ and symmetric DP pulses bilaterally, no LE edema  Lungs: respirations non-labored, no conversational dyspnea   Abd: non-distended, no rigidity  MSK: no amputation or deformity, no swelling of extremities  Neuro: AAOx3, CN2-12 grossly intact  Skin: No rashes, warm and dry  Psychiatric: cooperative, pleasant, mood and affect appropriate for age    MUSCULOSKELETAL EXAM:    RIGHT KNEE EXAMINATION   Affected side is compared to contralateral knee     Observation:  No edema, erythema, ecchymosis, or effusion noted.  No muscle atrophy of the thighs and calves noted.  No obvious bony deformities noted.   No genu valgus/varum noted. No recurvatum noted.      Tenderness:   Patella - none    Lateral joint line - present  Quad tendon - none   Medial joint line - none  Patellar tendon - positive  Medial plica - none  Tibial tubercle - none   Lateral plica - none  Pes anserine - none   MCL prox - none  Distal ITB - none   MCL distal - none  MFC - none    LCL prox - none  LFC - none    LCL distal - none  Tibia - none    Fibula - none    No obvious bursae, plicae, or tendon derangement palpated.    + tenderness posterior knee, + palpable and painful popliteal cyst          ROM:  Active extension to 0° on left without hyperextension, lag, crepitus, or patellar J sign.   Active extension to 0° on right without hyperextension, lag, crepitus, or patellar J sign.   Active flexion to 135° on left and " 130° on right. Decreased on right due to pain.    Strength: (bilaterally) (*pain)  Knee Flexion - 5/5 on left and 5/5 on right  Knee Extension - 5/5 on left and 5/5 on right  Hip Flexion - 5/5 on left and 5/5 on right  Hip Extension - 5/5 on left and 5/5 on right  Ankle dorsiflexion - 5/5 on left and 5/5 on right  Ankle Plantarflexion - 5/5 on left and 5/5 on right    Patellofemoral Exam:  Patellar ballottement - negative  Bulge sign - negative  Patellar grind - negative  No patellar laxity with medial and lateral translation   No apprehension with medial and lateral patellar translation.     Meniscus Testing:     + pain if posterior knee with flexion and extension  Lucass test - negative   Bounce home test - negative    Ligament Testing:  Lachman's test - negative  No laxity with varus testing at 0 and 30 degrees.  No laxity with valgus testing at 0 and 30 degrees.    IT Band Testing:  Noble Compression test - negative    Neurovascular Examination:   Normal gait without antalgia.  Sensation intact to light touch in the obturator, lateral/intermediate/medial/posterior femoral cutaneous, saphenous, and common peroneal nerves bilaterally.  Pulses intact at the DP and PT arteries bilaterally.    Capillary refill intact <2 seconds in all toes bilaterally.    IMAGIN. X-ray obtained 2024 due to right knee pain   2. X-ray images were reviewed personally by me and then directly with patient.  3. FINDINGS: X-ray images obtained demonstrate no fracture or dislocation, joint spaces maintained  4. IMPRESSION: As above.     1. MRI obtained 2024 due to right knee pain   2. MRI images were reviewed personally by me and then directly with patient.  3. FINDINGS: MRI images obtained demonstrate small Baker's cyst. Soft tissue edema in the posteromedial knee, which could represent an inflamed or partially ruptured Baker's cyst, or low-grade capsular sprain. No meniscal tear or cartilage defect.  4. IMPRESSION: As  "above.       Large Joint Aspiration/Injection  Popliteal cyst, right  Performed by: MITESH FULTON.  Authorized by: MITESH FULTON  Consent Done?: Yes (Verbal)  Indications: Pain and swelling from cyst  Site marked: The procedure site was marked   Timeout: Prior to procedure the correct patient, procedure, and site was verified      Location: Knee joint, right  Prep: Patient was prepped with Chlorhexidine and alcohol.  Ultrasound Guidance for needle placement: yes  Procedure: Ethyl Chloride spray was used prior to skin puncture. After cold spray was applied, 2-4 cc's of 0.2% ropivacaine was injected into the skin and superficial tissue at the injection site using a 25G, 1.5" needle to form an anesthetic tunnel and ensure proper placement of the needle into the cyst. After local anesthetic was applied an 18G, 1.5 needle was used to enter the cyst under US guidance.  1.5 cc of clear synovial fluid was then aspirated. Following aspiration, a 3 cc mixture of 1 cc of 40 mg/ml triamcinolone acetonide and 2 cc of 0.2% Naropin  was injected into the popliteal cyst.   Approach: posterior  Medications: 40 mg triamcinolone acetonide 40 mg/mL  Patient tolerance: Patient tolerated the procedure well with no immediate complications  Dressing: Band-aid was placed over injection site following the procedure and a compression dressing was placed around knee.      Ultrasound guidance was used for needle localization with SonoSite Edge 2, 15-6 MHz (L)probe(s). Images were saved and stored for documentation. Short and long axis images of the anterior knee were taken prior to injection confirming presence of joint effusion. The knee joint well visualized. Dynamic visualization of the needles was continuous throughout the procedure and maintained good position and correct needle placement.     Triamcinolone:  NDC: [address]-7  LOT: [id number]  EXP: 2025-08      ASSESSMENT:      ICD-10-CM ICD-9-CM   1. Acute pain of right knee  M25.561 " 719.46   2. Popliteal cyst, right  M71.21 727.51   3. Knee instability, right  M25.361 718.86       PLAN:  1-2. 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      - She had right knee Baker's cyst aspiration without CSI at visit 01/04/2024 and admits to appreciating 1 day of improvement following.     - Symptoms, exam, and imaging are most consistent with popliteal cyst causing compression on the surrounding MSK structures.     - After discussing options, she elects to proceed with ultrasound guided left knee popliteal cyst aspiration and corticosteroid injection under ultrasound guidance. See above for procedure detail.     - Patient fitted for right knee compression sleeve today by DME to help with swelling and sensation of instability.     - OK to continue with basketball activity as tolerated.     - Contact has been made with their  who is on board with the plan.       Future planning includes - nerve conduction study if not improved as she is increasingly more sensitive to palpation around the knee    All questions were answered to the best of my ability and all concerns were addressed at this time.    Follow up pending response to procedure today.       This note is dictated using the M*Modal Fluency Direct word recognition program. There are word recognition mistakes that are occasionally missed on review.      Total time spent face-to face with patient counseling or coordinating care including prognosis, differential diagnosis, risks and benefits of treatment, instructions, compliance risk reductions as well as non-face-to-face time personally spent reviewing medial record, medical documentation, and coordination of care.     EST MINUTES X   82757 10-19    02814 20-29    76320 30-39 X   99215 40-54    NEW     14874 15-29    54578 30-44     07639 45-59    34156 60-74    PHONE      5-10    81480 11-20    81824 21-30

## 2024-02-06 ENCOUNTER — OFFICE VISIT (OUTPATIENT)
Dept: SPORTS MEDICINE | Facility: CLINIC | Age: 24
End: 2024-02-06
Payer: COMMERCIAL

## 2024-02-06 VITALS
SYSTOLIC BLOOD PRESSURE: 101 MMHG | HEART RATE: 77 BPM | WEIGHT: 165.38 LBS | HEIGHT: 68 IN | BODY MASS INDEX: 25.07 KG/M2 | DIASTOLIC BLOOD PRESSURE: 67 MMHG

## 2024-02-06 DIAGNOSIS — M71.21 POPLITEAL CYST, RIGHT: ICD-10-CM

## 2024-02-06 DIAGNOSIS — M25.561 ACUTE PAIN OF RIGHT KNEE: Primary | ICD-10-CM

## 2024-02-06 DIAGNOSIS — M25.361 KNEE INSTABILITY, RIGHT: ICD-10-CM

## 2024-02-06 PROCEDURE — 3074F SYST BP LT 130 MM HG: CPT | Mod: CPTII,S$GLB,, | Performed by: ORTHOPAEDIC SURGERY

## 2024-02-06 PROCEDURE — 3008F BODY MASS INDEX DOCD: CPT | Mod: CPTII,S$GLB,, | Performed by: ORTHOPAEDIC SURGERY

## 2024-02-06 PROCEDURE — 1159F MED LIST DOCD IN RCRD: CPT | Mod: CPTII,S$GLB,, | Performed by: ORTHOPAEDIC SURGERY

## 2024-02-06 PROCEDURE — 1160F RVW MEDS BY RX/DR IN RCRD: CPT | Mod: CPTII,S$GLB,, | Performed by: ORTHOPAEDIC SURGERY

## 2024-02-06 PROCEDURE — 20611 DRAIN/INJ JOINT/BURSA W/US: CPT | Mod: RT,S$GLB,, | Performed by: ORTHOPAEDIC SURGERY

## 2024-02-06 PROCEDURE — 99999 PR PBB SHADOW E&M-EST. PATIENT-LVL III: CPT | Mod: PBBFAC,,, | Performed by: ORTHOPAEDIC SURGERY

## 2024-02-06 PROCEDURE — 99214 OFFICE O/P EST MOD 30 MIN: CPT | Mod: 25,S$GLB,, | Performed by: ORTHOPAEDIC SURGERY

## 2024-02-06 PROCEDURE — 3078F DIAST BP <80 MM HG: CPT | Mod: CPTII,S$GLB,, | Performed by: ORTHOPAEDIC SURGERY

## 2024-02-06 RX ORDER — TRIAMCINOLONE ACETONIDE 40 MG/ML
40 INJECTION, SUSPENSION INTRA-ARTICULAR; INTRAMUSCULAR
Status: COMPLETED | OUTPATIENT
Start: 2024-02-06 | End: 2024-02-06

## 2024-02-06 RX ADMIN — TRIAMCINOLONE ACETONIDE 40 MG: 40 INJECTION, SUSPENSION INTRA-ARTICULAR; INTRAMUSCULAR at 09:02
